# Patient Record
Sex: MALE | Race: WHITE | Employment: FULL TIME | URBAN - METROPOLITAN AREA
[De-identification: names, ages, dates, MRNs, and addresses within clinical notes are randomized per-mention and may not be internally consistent; named-entity substitution may affect disease eponyms.]

---

## 2019-05-29 ENCOUNTER — EMPLOYEE WELLNESS (OUTPATIENT)
Dept: OTHER | Age: 59
End: 2019-05-29

## 2019-05-29 LAB
CHOLESTEROL, TOTAL: 169 MG/DL (ref 0–199)
GLUCOSE BLD-MCNC: 105 MG/DL (ref 70–99)
HDLC SERPL-MCNC: 34 MG/DL (ref 40–60)
LDL CHOLESTEROL CALCULATED: 91 MG/DL
TRIGL SERPL-MCNC: 220 MG/DL (ref 0–150)

## 2019-06-03 VITALS — WEIGHT: 240 LBS

## 2019-08-14 ENCOUNTER — TELEPHONE (OUTPATIENT)
Dept: INTERNAL MEDICINE CLINIC | Age: 59
End: 2019-08-14

## 2019-10-18 ENCOUNTER — OFFICE VISIT (OUTPATIENT)
Dept: INTERNAL MEDICINE CLINIC | Age: 59
End: 2019-10-18
Payer: COMMERCIAL

## 2019-10-18 VITALS
HEART RATE: 60 BPM | BODY MASS INDEX: 29.35 KG/M2 | DIASTOLIC BLOOD PRESSURE: 74 MMHG | SYSTOLIC BLOOD PRESSURE: 136 MMHG | HEIGHT: 76 IN | WEIGHT: 241 LBS

## 2019-10-18 DIAGNOSIS — Z13.220 SCREENING CHOLESTEROL LEVEL: ICD-10-CM

## 2019-10-18 DIAGNOSIS — Z76.89 ENCOUNTER TO ESTABLISH CARE: Primary | ICD-10-CM

## 2019-10-18 DIAGNOSIS — Z13.1 DIABETES MELLITUS SCREENING: ICD-10-CM

## 2019-10-18 DIAGNOSIS — Z23 NEED FOR INFLUENZA VACCINATION: ICD-10-CM

## 2019-10-18 DIAGNOSIS — K21.9 GASTROESOPHAGEAL REFLUX DISEASE WITHOUT ESOPHAGITIS: ICD-10-CM

## 2019-10-18 DIAGNOSIS — Z00.00 ANNUAL PHYSICAL EXAM: ICD-10-CM

## 2019-10-18 PROCEDURE — 90686 IIV4 VACC NO PRSV 0.5 ML IM: CPT | Performed by: NURSE PRACTITIONER

## 2019-10-18 PROCEDURE — 99386 PREV VISIT NEW AGE 40-64: CPT | Performed by: NURSE PRACTITIONER

## 2019-10-18 PROCEDURE — 90471 IMMUNIZATION ADMIN: CPT | Performed by: NURSE PRACTITIONER

## 2019-10-18 RX ORDER — NIACIN 500 MG/1
1 TABLET, EXTENDED RELEASE ORAL
COMMUNITY
Start: 2011-10-05 | End: 2022-11-03

## 2019-10-18 RX ORDER — LANSOPRAZOLE 15 MG/1
1 CAPSULE, DELAYED RELEASE ORAL
COMMUNITY
Start: 2012-09-12

## 2019-10-18 ASSESSMENT — PATIENT HEALTH QUESTIONNAIRE - PHQ9
1. LITTLE INTEREST OR PLEASURE IN DOING THINGS: 0
SUM OF ALL RESPONSES TO PHQ9 QUESTIONS 1 & 2: 0
SUM OF ALL RESPONSES TO PHQ QUESTIONS 1-9: 0
2. FEELING DOWN, DEPRESSED OR HOPELESS: 0
SUM OF ALL RESPONSES TO PHQ QUESTIONS 1-9: 0

## 2019-10-21 PROBLEM — K21.9 GASTROESOPHAGEAL REFLUX DISEASE WITHOUT ESOPHAGITIS: Status: ACTIVE | Noted: 2019-10-21

## 2019-10-21 ASSESSMENT — ENCOUNTER SYMPTOMS
RESPIRATORY NEGATIVE: 1
GASTROINTESTINAL NEGATIVE: 1

## 2020-07-20 ENCOUNTER — EMPLOYEE WELLNESS (OUTPATIENT)
Dept: OTHER | Age: 60
End: 2020-07-20

## 2020-07-20 LAB
CHOLESTEROL, TOTAL: 179 MG/DL (ref 0–199)
GLUCOSE BLD-MCNC: 97 MG/DL (ref 70–99)
HDLC SERPL-MCNC: 31 MG/DL (ref 40–60)
LDL CHOLESTEROL CALCULATED: 91 MG/DL
TRIGL SERPL-MCNC: 287 MG/DL (ref 0–150)

## 2020-09-11 ENCOUNTER — TELEPHONE (OUTPATIENT)
Dept: INTERNAL MEDICINE CLINIC | Age: 60
End: 2020-09-11

## 2020-09-11 NOTE — TELEPHONE ENCOUNTER
If this message is directed to our office, please schedule an appointment for new patient physical at an opening for this

## 2020-09-11 NOTE — TELEPHONE ENCOUNTER
----- Message from Nora Huff sent at 9/11/2020 10:32 AM EDT -----  Subject: Appointment Request    Reason for Call: New Patient Request Appointment    QUESTIONS  Type of Appointment? New Patient/New to Provider  Reason for appointment request? No appointments available during search  Additional Information for Provider? Patient is requesting a new patient   appointment with Kulwinder Griffith. Screen green. ---------------------------------------------------------------------------  --------------  [facility] [employer] [facility]  What is the best way for the office to contact you? OK to leave message on   voicemail  Preferred Call Back Phone Number? [phone]  ---------------------------------------------------------------------------  --------------  SCRIPT ANSWERS  Relationship to Patient? Self  Appointment reason? Establish Care/Find a provider  Have you been diagnosed with   tested for   or told that you are suspected of having COVID-19 (Coronavirus)? No  Have you had a fever or taken medication to treat a fever within the past   3 days? No  Have you had a cough   shortness of breath or flu-like symptoms within the past 3 days? No  Do you currently have flu-like symptoms including fever or chills   cough   shortness of breath   or difficulty breathing   or new loss of taste or smell? No  (Service Expert  click yes below to proceed with [employer] As Usual   Scheduling)?  Yes

## 2020-09-11 NOTE — TELEPHONE ENCOUNTER
LVM for pt to call back the office to schedule appointment if patient would like   I did mention that Dr. Maye Dickson is booked out until end of December early Jan but were more than happy to schedule patient.

## 2020-10-19 VITALS — BODY MASS INDEX: 29.09 KG/M2 | WEIGHT: 239 LBS

## 2020-12-17 ENCOUNTER — OFFICE VISIT (OUTPATIENT)
Dept: INTERNAL MEDICINE CLINIC | Age: 60
End: 2020-12-17
Payer: COMMERCIAL

## 2020-12-17 VITALS
BODY MASS INDEX: 28.75 KG/M2 | HEART RATE: 70 BPM | TEMPERATURE: 97.3 F | HEIGHT: 76 IN | SYSTOLIC BLOOD PRESSURE: 128 MMHG | DIASTOLIC BLOOD PRESSURE: 72 MMHG | OXYGEN SATURATION: 98 % | WEIGHT: 236.1 LBS

## 2020-12-17 DIAGNOSIS — N40.0 BENIGN PROSTATIC HYPERPLASIA WITHOUT LOWER URINARY TRACT SYMPTOMS: ICD-10-CM

## 2020-12-17 DIAGNOSIS — E78.1 HYPERTRIGLYCERIDEMIA: ICD-10-CM

## 2020-12-17 DIAGNOSIS — E03.9 HYPOTHYROIDISM, UNSPECIFIED TYPE: ICD-10-CM

## 2020-12-17 DIAGNOSIS — Z00.00 ROUTINE GENERAL MEDICAL EXAMINATION AT A HEALTH CARE FACILITY: ICD-10-CM

## 2020-12-17 PROBLEM — G47.33 OSA (OBSTRUCTIVE SLEEP APNEA): Status: ACTIVE | Noted: 2020-12-17

## 2020-12-17 PROBLEM — R20.0 NUMBNESS AND TINGLING IN LEFT HAND: Status: ACTIVE | Noted: 2020-12-17

## 2020-12-17 PROBLEM — R20.2 NUMBNESS AND TINGLING IN LEFT HAND: Status: ACTIVE | Noted: 2020-12-17

## 2020-12-17 LAB
A/G RATIO: 2.2 (ref 1.1–2.2)
ALBUMIN SERPL-MCNC: 4.8 G/DL (ref 3.4–5)
ALP BLD-CCNC: 67 U/L (ref 40–129)
ALT SERPL-CCNC: 26 U/L (ref 10–40)
ANION GAP SERPL CALCULATED.3IONS-SCNC: 10 MMOL/L (ref 3–16)
AST SERPL-CCNC: 23 U/L (ref 15–37)
BILIRUB SERPL-MCNC: 1.2 MG/DL (ref 0–1)
BUN BLDV-MCNC: 18 MG/DL (ref 7–20)
CALCIUM SERPL-MCNC: 9.5 MG/DL (ref 8.3–10.6)
CHLORIDE BLD-SCNC: 103 MMOL/L (ref 99–110)
CHOLESTEROL, FASTING: 168 MG/DL (ref 0–199)
CO2: 27 MMOL/L (ref 21–32)
CREAT SERPL-MCNC: 0.9 MG/DL (ref 0.9–1.3)
GFR AFRICAN AMERICAN: >60
GFR NON-AFRICAN AMERICAN: >60
GLOBULIN: 2.2 G/DL
GLUCOSE BLD-MCNC: 115 MG/DL (ref 70–99)
HCT VFR BLD CALC: 44.6 % (ref 40.5–52.5)
HDLC SERPL-MCNC: 34 MG/DL (ref 40–60)
HEMOGLOBIN: 15.3 G/DL (ref 13.5–17.5)
HEPATITIS C ANTIBODY INTERPRETATION: NORMAL
LDL CHOLESTEROL CALCULATED: 87 MG/DL
MCH RBC QN AUTO: 30.3 PG (ref 26–34)
MCHC RBC AUTO-ENTMCNC: 34.2 G/DL (ref 31–36)
MCV RBC AUTO: 88.6 FL (ref 80–100)
PDW BLD-RTO: 13.3 % (ref 12.4–15.4)
PLATELET # BLD: 227 K/UL (ref 135–450)
PMV BLD AUTO: 8.1 FL (ref 5–10.5)
POTASSIUM SERPL-SCNC: 4.6 MMOL/L (ref 3.5–5.1)
PROSTATE SPECIFIC ANTIGEN: 1.4 NG/ML (ref 0–4)
RBC # BLD: 5.03 M/UL (ref 4.2–5.9)
SODIUM BLD-SCNC: 140 MMOL/L (ref 136–145)
TOTAL PROTEIN: 7 G/DL (ref 6.4–8.2)
TRIGLYCERIDE, FASTING: 235 MG/DL (ref 0–150)
TSH REFLEX: 2.4 UIU/ML (ref 0.27–4.2)
VLDLC SERPL CALC-MCNC: 47 MG/DL
WBC # BLD: 7.5 K/UL (ref 4–11)

## 2020-12-17 PROCEDURE — 99396 PREV VISIT EST AGE 40-64: CPT | Performed by: NURSE PRACTITIONER

## 2020-12-17 RX ORDER — ASPIRIN 81 MG/1
81 TABLET ORAL DAILY
COMMUNITY
End: 2022-11-03

## 2020-12-17 ASSESSMENT — PATIENT HEALTH QUESTIONNAIRE - PHQ9
SUM OF ALL RESPONSES TO PHQ QUESTIONS 1-9: 0
DEPRESSION UNABLE TO ASSESS: FUNCTIONAL CAPACITY MOTIVATION LIMITS ACCURACY
SUM OF ALL RESPONSES TO PHQ QUESTIONS 1-9: 0
1. LITTLE INTEREST OR PLEASURE IN DOING THINGS: 0
SUM OF ALL RESPONSES TO PHQ QUESTIONS 1-9: 0
2. FEELING DOWN, DEPRESSED OR HOPELESS: 0
SUM OF ALL RESPONSES TO PHQ9 QUESTIONS 1 & 2: 0

## 2020-12-17 ASSESSMENT — ENCOUNTER SYMPTOMS
WHEEZING: 0
SINUS PRESSURE: 0
BACK PAIN: 0
SHORTNESS OF BREATH: 0
DIARRHEA: 0
COLOR CHANGE: 0
ABDOMINAL PAIN: 0
CONSTIPATION: 0
SINUS PAIN: 0
COUGH: 0
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
## 2020-12-17 NOTE — ASSESSMENT & PLAN NOTE
Gradual improvement in symptoms   Continue ROM exercises   Tylenol/ NSiAIDs prn  Call if symptoms worsen or fail to improve

## 2020-12-18 LAB
HIV AG/AB: NORMAL
HIV ANTIGEN: NORMAL
HIV-1 ANTIBODY: NORMAL
HIV-2 AB: NORMAL

## 2020-12-21 PROBLEM — R73.01 IMPAIRED FASTING GLUCOSE: Status: ACTIVE | Noted: 2020-12-21

## 2020-12-28 ENCOUNTER — PATIENT MESSAGE (OUTPATIENT)
Dept: INTERNAL MEDICINE CLINIC | Age: 60
End: 2020-12-28

## 2020-12-28 NOTE — TELEPHONE ENCOUNTER
From: Gissell Coreas  To: SONIA Zuleta - CNP  Sent: 12/28/2020 10:13 AM EST  Subject: Non-Urgent Medical Question    Shanthi Avila,   I tried to order some new CPAP supplies from [facility] for my sleep apnea as I have in the past, but due to their requirement to have an updated script from my physician every two years, they could not refill my order (for a new mask and water supply tray). Can you write one for me? Please let me know. Thank you!   Taz Vega

## 2021-01-12 ENCOUNTER — OFFICE VISIT (OUTPATIENT)
Dept: PRIMARY CARE CLINIC | Age: 61
End: 2021-01-12
Payer: COMMERCIAL

## 2021-01-12 VITALS
TEMPERATURE: 98.5 F | SYSTOLIC BLOOD PRESSURE: 125 MMHG | HEART RATE: 82 BPM | OXYGEN SATURATION: 97 % | WEIGHT: 241.6 LBS | BODY MASS INDEX: 28.53 KG/M2 | DIASTOLIC BLOOD PRESSURE: 75 MMHG | HEIGHT: 77 IN

## 2021-01-12 DIAGNOSIS — E78.1 HYPERTRIGLYCERIDEMIA: ICD-10-CM

## 2021-01-12 DIAGNOSIS — Z23 NEED FOR SHINGLES VACCINE: ICD-10-CM

## 2021-01-12 DIAGNOSIS — Z12.11 SCREEN FOR COLON CANCER: Primary | ICD-10-CM

## 2021-01-12 DIAGNOSIS — R73.9 HYPERGLYCEMIA: ICD-10-CM

## 2021-01-12 PROCEDURE — 99213 OFFICE O/P EST LOW 20 MIN: CPT | Performed by: FAMILY MEDICINE

## 2021-01-12 SDOH — ECONOMIC STABILITY: INCOME INSECURITY: HOW HARD IS IT FOR YOU TO PAY FOR THE VERY BASICS LIKE FOOD, HOUSING, MEDICAL CARE, AND HEATING?: NOT HARD AT ALL

## 2021-01-12 ASSESSMENT — PATIENT HEALTH QUESTIONNAIRE - PHQ9
SUM OF ALL RESPONSES TO PHQ QUESTIONS 1-9: 0
1. LITTLE INTEREST OR PLEASURE IN DOING THINGS: 0
SUM OF ALL RESPONSES TO PHQ QUESTIONS 1-9: 0

## 2021-01-12 NOTE — PROGRESS NOTES
Jono Hebert (:  1960) is a 64 y.o. male,Established patient, here for evaluation of the following chief complaint(s):  Establish Care (blood test)      ASSESSMENT/PLAN:  Hyperglycemia  Hypertrig      P: refer to GI  Get labs  Shingrix, will have to get this in a pharmacy    SUBJECTIVE/OBJECTIVE:  HPI 63 yo man, hist of high trig/ low HDL  And recently  started 1 gm fish oil daily. On lansoprazole for his GERD  Review of Systems    Physical Exam  Constitutional:       Appearance: Normal appearance. HENT:      Head: Normocephalic and atraumatic. Neck:      Thyroid: No thyromegaly. Vascular: No carotid bruit. Cardiovascular:      Rate and Rhythm: Normal rate and regular rhythm. Heart sounds: No murmur. Pulmonary:      Effort: Pulmonary effort is normal.      Breath sounds: Normal breath sounds. Abdominal:      General: Bowel sounds are normal.      Palpations: Abdomen is soft. There is no hepatomegaly or splenomegaly. Tenderness: There is no abdominal tenderness. Musculoskeletal:      Right lower leg: No edema. Left lower leg: No edema. Lymphadenopathy:      Cervical: No cervical adenopathy. Neurological:      Mental Status: He is alert. Psychiatric:         Mood and Affect: Mood normal.         Behavior: Behavior normal.       Vitals:    21 1530   BP: 125/75   Site: Right Upper Arm   Position: Sitting   Cuff Size: Medium Adult   Pulse: 82   Temp: 98.5 °F (36.9 °C)   TempSrc: Infrared   SpO2: 97%   Weight: 241 lb 9.6 oz (109.6 kg)   Height: 6' 4.8\" (1.951 m)     An electronic signature was used to authenticate this note.     --Karina Dyer MD

## 2021-01-13 DIAGNOSIS — R73.9 HYPERGLYCEMIA: ICD-10-CM

## 2021-01-13 DIAGNOSIS — E78.1 HYPERTRIGLYCERIDEMIA: ICD-10-CM

## 2021-01-13 LAB
CHOLESTEROL, TOTAL: 191 MG/DL (ref 0–199)
GLUCOSE FASTING: 98 MG/DL (ref 70–99)
HDLC SERPL-MCNC: 35 MG/DL (ref 40–60)
LDL CHOLESTEROL CALCULATED: 106 MG/DL
TRIGL SERPL-MCNC: 251 MG/DL (ref 0–150)
VLDLC SERPL CALC-MCNC: 50 MG/DL

## 2021-01-14 LAB
ESTIMATED AVERAGE GLUCOSE: 105.4 MG/DL
HBA1C MFR BLD: 5.3 %

## 2021-01-16 PROBLEM — Z00.00 ROUTINE GENERAL MEDICAL EXAMINATION AT A HEALTH CARE FACILITY: Status: RESOLVED | Noted: 2020-12-17 | Resolved: 2021-01-16

## 2021-04-16 ENCOUNTER — OFFICE VISIT (OUTPATIENT)
Dept: PRIMARY CARE CLINIC | Age: 61
End: 2021-04-16
Payer: COMMERCIAL

## 2021-04-16 DIAGNOSIS — Z01.818 PREOP EXAMINATION: Primary | ICD-10-CM

## 2021-04-16 LAB — SARS-COV-2: NOT DETECTED

## 2021-04-16 PROCEDURE — 99211 OFF/OP EST MAY X REQ PHY/QHP: CPT | Performed by: NURSE PRACTITIONER

## 2021-04-22 ENCOUNTER — ANESTHESIA (OUTPATIENT)
Dept: ENDOSCOPY | Age: 61
End: 2021-04-22
Payer: COMMERCIAL

## 2021-04-22 ENCOUNTER — HOSPITAL ENCOUNTER (OUTPATIENT)
Age: 61
Setting detail: OUTPATIENT SURGERY
Discharge: HOME | End: 2021-04-22
Attending: INTERNAL MEDICINE | Admitting: INTERNAL MEDICINE
Payer: COMMERCIAL

## 2021-04-22 ENCOUNTER — ANESTHESIA EVENT (OUTPATIENT)
Dept: ENDOSCOPY | Age: 61
End: 2021-04-22
Payer: COMMERCIAL

## 2021-04-22 VITALS
RESPIRATION RATE: 16 BRPM | TEMPERATURE: 97.8 F | DIASTOLIC BLOOD PRESSURE: 75 MMHG | HEIGHT: 76 IN | OXYGEN SATURATION: 99 % | SYSTOLIC BLOOD PRESSURE: 114 MMHG | WEIGHT: 230 LBS | BODY MASS INDEX: 28.01 KG/M2 | HEART RATE: 53 BPM

## 2021-04-22 VITALS
DIASTOLIC BLOOD PRESSURE: 64 MMHG | RESPIRATION RATE: 16 BRPM | OXYGEN SATURATION: 100 % | SYSTOLIC BLOOD PRESSURE: 105 MMHG

## 2021-04-22 PROCEDURE — 3700000000 HC ANESTHESIA ATTENDED CARE: Performed by: INTERNAL MEDICINE

## 2021-04-22 PROCEDURE — 3700000001 HC ADD 15 MINUTES (ANESTHESIA): Performed by: INTERNAL MEDICINE

## 2021-04-22 PROCEDURE — 2500000003 HC RX 250 WO HCPCS: Performed by: REGISTERED NURSE

## 2021-04-22 PROCEDURE — 2580000003 HC RX 258: Performed by: ANESTHESIOLOGY

## 2021-04-22 PROCEDURE — 88305 TISSUE EXAM BY PATHOLOGIST: CPT

## 2021-04-22 PROCEDURE — 7100000011 HC PHASE II RECOVERY - ADDTL 15 MIN: Performed by: INTERNAL MEDICINE

## 2021-04-22 PROCEDURE — 3609010600 HC COLONOSCOPY POLYPECTOMY SNARE/COLD BIOPSY: Performed by: INTERNAL MEDICINE

## 2021-04-22 PROCEDURE — 6360000002 HC RX W HCPCS: Performed by: REGISTERED NURSE

## 2021-04-22 PROCEDURE — 2709999900 HC NON-CHARGEABLE SUPPLY: Performed by: INTERNAL MEDICINE

## 2021-04-22 PROCEDURE — 7100000010 HC PHASE II RECOVERY - FIRST 15 MIN: Performed by: INTERNAL MEDICINE

## 2021-04-22 RX ORDER — PROPOFOL 10 MG/ML
INJECTION, EMULSION INTRAVENOUS CONTINUOUS PRN
Status: DISCONTINUED | OUTPATIENT
Start: 2021-04-22 | End: 2021-04-22 | Stop reason: SDUPTHER

## 2021-04-22 RX ORDER — LIDOCAINE HYDROCHLORIDE 20 MG/ML
INJECTION, SOLUTION EPIDURAL; INFILTRATION; INTRACAUDAL; PERINEURAL PRN
Status: DISCONTINUED | OUTPATIENT
Start: 2021-04-22 | End: 2021-04-22 | Stop reason: SDUPTHER

## 2021-04-22 RX ORDER — SODIUM CHLORIDE 9 MG/ML
INJECTION, SOLUTION INTRAVENOUS CONTINUOUS
Status: DISCONTINUED | OUTPATIENT
Start: 2021-04-22 | End: 2021-04-22 | Stop reason: HOSPADM

## 2021-04-22 RX ORDER — PROPOFOL 10 MG/ML
INJECTION, EMULSION INTRAVENOUS PRN
Status: DISCONTINUED | OUTPATIENT
Start: 2021-04-22 | End: 2021-04-22 | Stop reason: SDUPTHER

## 2021-04-22 RX ORDER — GLYCOPYRROLATE 0.2 MG/ML
INJECTION INTRAMUSCULAR; INTRAVENOUS PRN
Status: DISCONTINUED | OUTPATIENT
Start: 2021-04-22 | End: 2021-04-22 | Stop reason: SDUPTHER

## 2021-04-22 RX ADMIN — SODIUM CHLORIDE: 9 INJECTION, SOLUTION INTRAVENOUS at 08:09

## 2021-04-22 RX ADMIN — PROPOFOL 100 MG: 10 INJECTION, EMULSION INTRAVENOUS at 09:13

## 2021-04-22 RX ADMIN — SODIUM CHLORIDE: 9 INJECTION, SOLUTION INTRAVENOUS at 09:10

## 2021-04-22 RX ADMIN — PROPOFOL 140 MCG/KG/MIN: 10 INJECTION, EMULSION INTRAVENOUS at 09:13

## 2021-04-22 RX ADMIN — GLYCOPYRROLATE 0.2 MG: 0.2 INJECTION, SOLUTION INTRAMUSCULAR; INTRAVENOUS at 09:20

## 2021-04-22 RX ADMIN — GLYCOPYRROLATE 0.2 MG: 0.2 INJECTION, SOLUTION INTRAMUSCULAR; INTRAVENOUS at 09:14

## 2021-04-22 RX ADMIN — LIDOCAINE HYDROCHLORIDE 100 MG: 20 INJECTION, SOLUTION EPIDURAL; INFILTRATION; INTRACAUDAL; PERINEURAL at 09:13

## 2021-04-22 ASSESSMENT — ENCOUNTER SYMPTOMS: SHORTNESS OF BREATH: 0

## 2021-04-22 NOTE — PROGRESS NOTES
Dr Tori Steen at bedside reviewing results
See anesthesia record for Propofol dosages and vital signs.
To endo recovery from procedure room. VSS, awakens to voice, respirations easy and unlabored.
communication. If the ride is leaving the hospital grounds please make sure they are back in time for pickup. Have the patient inform the staff on arrival what their rides plans are while the patient is in the facility. At the MAIN there is one visitor allowed. Please note that the visitor policy is subject to change.

## 2021-04-22 NOTE — ANESTHESIA POSTPROCEDURE EVALUATION
Department of Anesthesiology  Postprocedure Note    Patient: Khari Tate  MRN: [id number]  YOB: 1960  Date of evaluation: 4/22/2021  Time:  9:51 AM     Procedure Summary     Date: 04/22/21 Room / Location: [address] [place] [address]    Anesthesia Start: 0022 Anesthesia Stop: 5609    Procedure: COLONOSCOPY POLYPECTOMY SNARE/COLD BIOPSY (N/A ) Diagnosis: (COLON CANCER SCREENING Z12.11)    Surgeons: Duane Youngblood MD Responsible Provider: Sophie Breaux MD    Anesthesia Type: MAC ASA Status: 2          Anesthesia Type: MAC    Pearl Phase I: Pearl Score: 10    Pearl Phase II: Pearl Score: 9    Last vitals: Reviewed and per EMR flowsheets.        Anesthesia Post Evaluation    Patient location during evaluation: PACU  Level of consciousness: awake  Airway patency: patent  Complications: no  Cardiovascular status: hemodynamically stable  Respiratory status: acceptable

## 2021-04-22 NOTE — H&P
Gastroenterology Note             Pre-operative History and Physical    Patient: Ricardo Covert  : 1960  CSN:     History Obtained From:  patient and/or guardian. HISTORY OF PRESENT ILLNESS:    The patient is a 64 y.o. male  here for colonoscopy. Past Medical History:    Past Medical History:   Diagnosis Date    GERD (gastroesophageal reflux disease)     Hypercholesterolemia     Sleep apnea     Testicular hypofunction      Past Surgical History:    Past Surgical History:   Procedure Laterality Date    ACHILLES TENDON SURGERY Right     CERVICAL DISC SURGERY  1984     Medications Prior to Admission:   No current facility-administered medications on file prior to encounter. Current Outpatient Medications on File Prior to Encounter   Medication Sig Dispense Refill    aspirin 81 MG EC tablet Take 81 mg by mouth daily      niacin (NIASPAN) 500 MG extended release tablet Take 1 tablet by mouth       lansoprazole (PREVACID) 15 MG delayed release capsule Take 1 capsule by mouth          Allergies:  Patient has no known allergies.       Social History:   Social History     Tobacco Use    Smoking status: Never Smoker    Smokeless tobacco: Never Used   Substance Use Topics    Alcohol use: Not Currently     Family History:   Family History   Problem Relation Age of Onset    Cancer Mother         pancreatic     Heart Disease Father     Heart Disease Paternal Uncle     Heart Attack Paternal Uncle        PHYSICAL EXAM:      /84   Pulse 56   Temp 97.3 °F (36.3 °C) (Temporal)   Resp 16   Ht 6' 4\" (1.93 m)   Wt 230 lb (104.3 kg)   SpO2 98%   BMI 28.00 kg/m²  I        Heart:   RRR, normal s1s2    Lungs:  CTA bilat,  Normal effort    Abdomen:   NT, ND      ASA Grade:  ASA 2 - Patient with mild systemic disease with no functional limitations    Mallampati Class:  Class I: Soft palate, uvula, fauces, pillars visible  __________  Class II: Soft palate, uvula, fauces

## 2021-04-22 NOTE — ANESTHESIA PRE PROCEDURE
Department of Anesthesiology  Preprocedure Note       Name:  Nahum Ramos   Age:  64 y.o.  :  1960                                          MRN:  [id number]         Date:  2021      Surgeon: Greer Brian):  Malik Parry MD    Procedure: Procedure(s):  COLONOSCOPY DIAGNOSTIC    Medications prior to admission:   Prior to Admission medications    Medication Sig Start Date End Date Taking? Authorizing Provider   aspirin 81 MG EC tablet Take 81 mg by mouth daily    Historical Provider, MD   niacin (NIASPAN) 500 MG extended release tablet Take 1 tablet by mouth  10/5/11   Historical Provider, MD   lansoprazole (PREVACID) 15 MG delayed release capsule Take 1 capsule by mouth 12   Historical Provider, MD       Current medications:    No current facility-administered medications for this encounter. Allergies:  No Known Allergies    Problem List:    Patient Active Problem List   Diagnosis Code    Gastroesophageal reflux disease without esophagitis K21.9    KENNY (obstructive sleep apnea) G47.33    Hypertriglyceridemia E78.1    Numbness and tingling in left hand R20.0, R20.2    Impaired fasting glucose R73.01       Past Medical History:        Diagnosis Date    GERD (gastroesophageal reflux disease)     Hypercholesterolemia     Sleep apnea     Testicular hypofunction        Past Surgical History:        Procedure Laterality Date    ACHILLES TENDON SURGERY Right     CERVICAL DISC SURGERY  1984       Social History:    Social History     Tobacco Use    Smoking status: Never Smoker    Smokeless tobacco: Never Used   Substance Use Topics    Alcohol use: Not Currently                                Counseling given: Not Answered      Vital Signs (Current): There were no vitals filed for this visit.                                            BP Readings from Last 3 Encounters:   21 125/75   20 128/72   10/18/19 136/74       NPO Status: disease       Endo/Other:        (-) diabetes mellitus, hypothyroidism               Abdominal:           Vascular:     - PVD. Anesthesia Plan      MAC     ASA 2       Induction: intravenous. Anesthetic plan and risks discussed with patient. Plan discussed with CRNA.                   Lisa Larsen MD   4/22/2021

## 2021-09-09 LAB
CHOLESTEROL, TOTAL: 180 MG/DL (ref 0–199)
GLUCOSE BLD-MCNC: 96 MG/DL (ref 70–99)
HDLC SERPL-MCNC: 31 MG/DL (ref 40–60)
LDL CHOLESTEROL CALCULATED: 116 MG/DL
TRIGL SERPL-MCNC: 165 MG/DL (ref 0–150)

## 2022-04-15 ENCOUNTER — OFFICE VISIT (OUTPATIENT)
Dept: ORTHOPEDIC SURGERY | Age: 62
End: 2022-04-15
Payer: COMMERCIAL

## 2022-04-15 VITALS — BODY MASS INDEX: 28.49 KG/M2 | WEIGHT: 234 LBS | HEIGHT: 76 IN

## 2022-04-15 DIAGNOSIS — M25.511 RIGHT SHOULDER PAIN, UNSPECIFIED CHRONICITY: ICD-10-CM

## 2022-04-15 DIAGNOSIS — M47.9 SPONDYLOSIS: Primary | ICD-10-CM

## 2022-04-15 PROCEDURE — 99203 OFFICE O/P NEW LOW 30 MIN: CPT | Performed by: ORTHOPAEDIC SURGERY

## 2022-04-15 NOTE — PROGRESS NOTES
Date:  4/15/2022    Name:  Faviola Villafana  [facility]  [address] [facility] [address]    :  1960      Age:   63 y.o.    SSN:  [id number]      Medical Record Number:  [id number]    Reason for Visit:    Chief Complaint    Shoulder Pain (new patient right shoulder )      DOS:4/15/2022     HPI: Faviola Villafana is a 63 y.o. male here today for right shoulder pain and neck pain. Patient reports right neck pain radiated down to his shoulder and his right upper arm and sometimes he has numbness and paresthesia over his fingers. He has a history of cervical spine fusion several years ago. He describes his pain as dull aching quality especially when he woke up from sleep or when he sleep at the same side. Also he describes sharp shooting pain from his neck to his upper arm when he moves his neck. Otherwise he denies popping, clicking, or any other mechanical symptoms in his shoulder. Pain Assessment  Location of Pain: Shoulder  Location Modifiers: Right  Severity of Pain: 2  Quality of Pain: Sharp  Duration of Pain: Persistent  Frequency of Pain: Intermittent  Aggravating Factors: Stretching  Limiting Behavior: Some  Relieving Factors: Rest,Ice,Exercise,Nsaids  Result of Injury: No  Work-Related Injury: No  Are there other pain locations you wish to document?: No  ROS: All systems reviewed on patient intake form. Pertinent items are noted in HPI.         Past Medical History:   Diagnosis Date    GERD (gastroesophageal reflux disease)     Hypercholesterolemia     Sleep apnea     Testicular hypofunction         Past Surgical History:   Procedure Laterality Date    ACHILLES TENDON SURGERY Right     CERVICAL DISC SURGERY  1984    COLONOSCOPY N/A 2021    COLONOSCOPY POLYPECTOMY SNARE/COLD BIOPSY performed by Zheng Haney MD at [address]       Family History   Problem Relation Age of Onset    Cancer Mother         pancreatic     Heart Disease Father     Heart Disease Paternal Uncle     Heart Attack Paternal Uncle        Social History     Socioeconomic History    Marital status:      Spouse name: None    Number of children: None    Years of education: None    Highest education level: None   Occupational History    None   Tobacco Use    Smoking status: Never Smoker    Smokeless tobacco: Never Used   Vaping Use    Vaping Use: Never used   Substance and Sexual Activity    Alcohol use: Not Currently    Drug use: Never    Sexual activity: Yes   Other Topics Concern    None   Social History Narrative    None     Social Determinants of Health     Financial Resource Strain:     Difficulty of Paying Living Expenses: Not on file   Food Insecurity:     Worried About Running Out of Food in the Last Year: Not on file    King of Food in the Last Year: Not on file   Transportation Needs:     Lack of Transportation (Medical): Not on file    Lack of Transportation (Non-Medical):  Not on file   Physical Activity:     Days of Exercise per Week: Not on file    Minutes of Exercise per Session: Not on file   Stress:     Feeling of Stress : Not on file   Social Connections:     Frequency of Communication with Friends and Family: Not on file    Frequency of Social Gatherings with Friends and Family: Not on file    Attends Jew Services: Not on file    Active Member of [address] [place] [address] or Organizations: Not on file    Attends Club or Organization Meetings: Not on file    Marital Status: Not on file   Intimate Partner Violence:     Fear of Current or Ex-Partner: Not on file    Emotionally Abused: Not on file    Physically Abused: Not on file    Sexually Abused: Not on file   Housing Stability:     Unable to Pay for Housing in the Last Year: Not on file    Number of [place] in the Last Year: Not on file    Unstable Housing in the Last Year: Not on file       Current Outpatient Medications   Medication Sig Dispense Refill    aspirin 81 MG EC tablet Take 81 mg by mouth daily      niacin (NIASPAN) 500 MG extended release tablet Take 1 tablet by mouth       lansoprazole (PREVACID) 15 MG delayed release capsule Take 1 capsule by mouth       No current facility-administered medications for this visit. No Known Allergies    Vital signs:  Ht 6' 4\" (1.93 m)   Wt 234 lb (106.1 kg)   BMI 28.48 kg/m²          Right shoulder exam    Inspection:  Held in a normal posture. Normal contour at the acromioclavicular joint. No swelling, ecchymosis, or erythema about the shoulder. No atrophy appreciated. No scapular winging. Palpation:  No subacromial crepitus. No tenderness of the AC joint. No greater tuberosity tenderness. No tenderness in the bicipital groove. Range of Motion: Full passive and active ROM. Normal scapulothoracic rhythm. Strength:  Normal supraspinatus, infraspinatus, and subscapularis muscle strength. Stability: No anterior instability. No posterior instability. Special Tests: Impingement findings are negative. Labral findings are negative. Speed sign and Yergason signs are both negative. Crossover sign is negative. Belly press sign is negative. Lift off sign is negative. Other findings: The skin is warm dry and well perfused. 2+ radial pulse. Sensation is intact to light touch over the deltoid. Left comparison shoulder exam    Inspection:  Held in a normal posture. Normal contour at the acromioclavicular joint. No swelling, ecchymosis, or erythema about the shoulder. No atrophy appreciated. No scapular winging. Palpation:  No subacromial crepitus. No tenderness of the AC joint. No greater tuberosity tenderness. No tenderness in the bicipital groove. Range of Motion: Full passive and active ROM. Normal scapulothoracic rhythm. Strength:  Normal supraspinatus, infraspinatus, and subscapularis muscle strength. Stability: No anterior instability. No posterior instability. Special Tests: Impingement findings are negative.  Labral findings are negative. Speed sign and Yergason signs are both negative. Crossover sign is negative. Belly press sign is negative. Lift off sign is negative. Other findings: The skin is warm dry and well perfused. 2+ radial pulse. Sensation is intact to light touch over the deltoid. CERVICAL SPINE EXAMINATION:     Inspection:  Local inspection shows no step-off or bruising. Cervical alignment is normal.     Palpation: There is tenderness over his cervical spine. Range of Motion: Full ROM including flexion, extension, and lateral bending     Strength: 5/5 bilateral upper extremities     Skin: Intact without rashes ulcerations or lesions. Sensation: Intact to light touch throughout both upper extremities. Special Tests: Negative Spurling. No focal motor deficits present throughout the upper extremities. Diagnostics:  Radiology:       X-rays of the right shoulder including  Grashey,  scapular Y and  axillary views were obtained and reviewed in office:    Impression: There is no evidence of fracture or dislocation. There is no evidence of glenohumeral osteoarthritis. Cervical x-ray:    AP and Lateral views were obtained and reviewed of the cervical spine. Impression: There is evidence of cervical spine fusion with some degenerative changes especially below the level of the fusion at C7-T1. He also has degenerative changes over his cervical spine    Assessment: 63years old male patient with cervical radiculopathy due to cervical spine spondylosis post spinal fusion    Plan: Pertinent imaging was reviewed. The etiology, natural history, and treatment options for the disorder were discussed. The roles of activity medication, antiinflammatories, injections, bracing, physical therapy, and surgical interventions were all described to the patient and questions were answered. Patient has right upper extremity radiculopathy. Symptoms mostly coming from his neck.   He has a history of cervical spine fusion several years ago. His x-ray shows that he has degenerative changes over his cervical spine especially at the level below the fusion. We think his symptoms coming from his neck because he has radiculopathy with numbness and paresthesia of both all over his right upper extremity. Clinical examination of his shoulder shows no rotator cuff muscle pathology and no impingement sign. He has full strength in his shoulder with no issues. We will refer him to the physical therapy to start working on his cervical spine addresses pain. We will see him back for a month if there is no improvement in his symptoms and may consider referring him for spine surgery. Richard Dang is in agreement with this plan. All questions were answered to patient's satisfaction and was encouraged to call with any further questions. The patient was advised that NSAID-type medications have several potential side effects that include: gastrointestinal irritation including hemorrhage, renal injury, as well as an increased risk for heart attack and stroke. The patient was asked to take the medication with food and to stop if there is any symptoms of GI upset, including heartburn, nausea, increased gas or diarrhea. I asked the patient to contact their medical provider for vomiting, abdominal pain or black/bloody stools. The patient should have renal function testing per his medical provider periodically if the medication is taken on a regular basis. The patient should be alert for any swelling in the lower extremities and should stop taking the medication immediately and contact their medical provider should this occur. In addition, the patient should stop taking the medication immediately and contact their medical provider should there be any shortness of breath, fatigue and be evaluated in an emergency facility for any chest pain.   The patient expresses understanding of these issues and questions were answered. Total time spent for evaluation, education, and development of treatment plan: 43 minutes. 4/15/2022  12:47 PM      Steffen Alvarez MD  Clinical Fellow at [address] [place] [address]    During this examination, Win Danielle MD functioned as a scribe for Dr. Dorys Mishra. This dictation was performed with a verbal recognition program (DRAGON) and it was checked for errors. It is possible that there are still dictated errors within this office note. If so, please bring any errors to my attention for an addendum. All efforts were made to ensure that this office note is accurate. Orders Placed This Encounter   Procedures    XR SHOULDER RIGHT (MIN 2 VIEWS)     [id number]     Standing Status:   Future     Number of Occurrences:   1     Standing Expiration Date:   4/14/2023     Order Specific Question:   Reason for exam:     Answer:   Pain    XR CERVICAL SPINE (2-3 VIEWS)     Standing Status:   Future     Number of Occurrences:   1     Standing Expiration Date:   4/15/2023     Order Specific Question:   Reason for exam:     Answer:   pain    Ambulatory referral to Physical Therapy     Referral Priority:   Routine     Referral Type:   Eval and Treat     Referral Reason:   Specialty Services Required     Requested Specialty:   Physical Therapy     Number of Visits Requested:   1     I attest that I met personally with the patient, performed the described exam, reviewed the radiographic studies and medical records associated with this patient and supervised the services that are described above.      Dorys Verma MD

## 2022-04-25 ENCOUNTER — HOSPITAL ENCOUNTER (OUTPATIENT)
Dept: PHYSICAL THERAPY | Age: 62
Setting detail: THERAPIES SERIES
Discharge: HOME | End: 2022-04-25
Payer: COMMERCIAL

## 2022-04-25 PROCEDURE — 97110 THERAPEUTIC EXERCISES: CPT | Performed by: PHYSICAL THERAPIST

## 2022-04-25 PROCEDURE — 97161 PT EVAL LOW COMPLEX 20 MIN: CPT | Performed by: PHYSICAL THERAPIST

## 2022-04-25 NOTE — PLAN OF CARE
The [address]  Phone [phone]  Fax [phone]                                                          Physical Therapy Certification    Dear Jayro Lee  ,    We had the pleasure of evaluating the following patient for physical therapy services at [address] [place] [address]. A summary of our findings can be found in the initial assessment below. This includes our plan of care. If you have any questions or concerns regarding these findings, please do not hesitate to contact me at the office phone number checked above. Thank you for the referral.       Physician Signature:_______________________________Date:__________________  By signing above (or electronic signature), therapists plan is approved by physician      Patient: Jade Franz   : 1960   MRN: [id number]  Referring Physician:  Jayro Lee      Evaluation Date: 2022      Medical Diagnosis Information:  Diagnosis: M47.9 (ICD-10-CM) - Spondylosis   Treatment Diagnosis: M54.2                                         Insurance information: PT Insurance Information: BCBS    Precautions/ Contra-indications:     Latex Allergy:  [x]NO      []YES  Preferred Language for Healthcare:   [x]English       []other:    SUBJECTIVE: Patient stated complaint: History of cervical fracture in  that required cervical fusion and Halo. 2 year recovery. Recent episode of pain, numbness and tingling over the last several weeks.        Relevant Medical History:History of cervical fracture in  that required cervical fusion and Halo    C-SSRS Triggered by Intake questionnaire (Past 2 wk assessment):   [x] No, Questionnaire did not trigger screening.   [] Yes, Patient intake triggered further evaluation      [] C-SSRS Screening completed  [] PCP notified via Plan of Care  [] Emergency services notified     Functional Disability Index: Foto 50/100    Pain Scale: 1-6/10  Easing factors: arm over head, change positions. Provocative factors: laying down and getting up in am is worst.       Type: []Constant   [x]Intermittent  [x]Radiating []Localized []other:     Numbness/Tingling: yes to R hand     Occupation/School:financial  consultant     Living Status/Prior Level of Function: Independent with ADLs and IADLs, running  Pushups     OBJECTIVE:     ROM AROM Comments   Flexion full    Extension Moderately limited    Side bend R 25% N/T into R UE    Side bend L 25%    Rotation R 50% N/T into R UE    Rotation L 50%    Shoulder AROM clearing              Strength L R Comments   Neck flexion (C1-2)  4+    Neck side bend (C3)  4+    Shoulder elevation (C4)  4+ some N/T R    Shoulder abduction (C5)  4+ some N/T R    Elbow flexion and /or wrist extension  (C6)  5    Elbow extension and/or wrist flexion  (C7)  5 some N/T R    Thumb extension and/or ulnar deviation ((C8)  5    Abduction and /or adduction of hand intrinsics (T1)  5          DTR L R Comments   Biceps (C5,C6) 1-2/3 1/3    Brachioradialis (C5,C6) 2/3 2/3    Triceps (C7,C8) 1-2/3 1/3              Special Tests Results Comments   Foraminal compression test      Distraction test     Upper limb tension test     Shoulder abduction(relief) test  Relief     Vertebral artery test      Valsalva test     Neurologic Signs N/T    Joint play assessments  Not assessed     other         Palpation: none performed    Functional Mobility/Transfers: wnl    Posture: mildly slouched with FHP    Bandages/Dressings/Incisions:     Gait: (include devices/WB status): WNL                          [x] Patient history, allergies, meds reviewed. Medical chart reviewed. See intake form. Review Of Systems (ROS):  [x]Performed Review of systems (Integumentary, CardioPulmonary, Neurological) by intake and observation. Intake form has been scanned into medical record.  Patient has been instructed to contact their primary care physician regarding ROS issues if not already being addressed at this time. Co-morbidities/Complexities (which will affect course of rehabilitation):   []None           Arthritic conditions   []Rheumatoid arthritis (M05.9)  []Osteoarthritis (M19.91)   Cardiovascular conditions   []Hypertension (I10)  []Hyperlipidemia (E78.5)  []Angina pectoris (I20)  []Atherosclerosis (I70)   Musculoskeletal conditions   []Disc pathology   []Congenital spine pathologies   [x]Prior surgical intervention  []Osteoporosis (M81.8)  []Osteopenia (M85.8)   Endocrine conditions   []Hypothyroid (E03.9)  []Hyperthyroid Gastrointestinal conditions   []Constipation (K80.35)   Metabolic conditions   []Morbid obesity (E66.01)  []Diabetes type 1(E10.65) or 2 (E11.65)   []Neuropathy (G60.9)     Pulmonary conditions   []Asthma (J45)  []Coughing   []COPD (J44.9)   Psychological Disorders  []Anxiety (F41.9)  []Depression (F32.9)   []Other:   []Other:          Barriers to/and or personal factors that will affect rehab potential:              [x]Age  [x]Sex              [x]Motivation/Lack of Motivation                        [x]Co-Morbidities              []Cognitive Function, education/learning barriers              []Environmental, home barriers              []profession/work barriers  []past PT/medical experience  []other:      Falls Risk Assessment (30 days):   [x] Falls Risk assessed and no intervention required.   [] Falls Risk assessed and Patient requires intervention due to being higher risk   TUG score (>12s at risk):     [] Falls education provided, including              ASSESSMENT:    Functional Impairments:     []Noted cervical/thoracic/GHJ joint hypomobility   []Noted cervical/thoracic/GHJ joint hypermobility   [x]Decreased cervical/UE functional ROM   []Noted Headache pain aggravated by neck movements with/without dizziness   [x]Abnormal reflexes/sensation/myotomal/dermatomal deficits   []Decreased DCF control or ability to hold head up   []Decreased RC/scapular/core strength and neuromuscular control    [x]Decreased UE functional strength   []other:      Functional Activity Limitations (from functional questionnaire and intake)   [x]Reduced ability to tolerate prolonged functional positions   [x]Reduced ability or difficulty with changes of positions or transfers between positions   [x]Reduced ability to maintain good posture and demonstrate good body mechanics with sitting, bending, and lifting   [x] Reduced ability or tolerance with driving and/or computer work   [x]Reduced ability to perform lifting, reaching, carrying tasks   []Reduced ability to concentrate   [x]Reduced ability to sleep    [x]Reduced ability to tolerate any impact through UE or spine   [x]Reduced ability to ambulate prolonged functional periods/distances   []other:    Participation Restrictions   []Reduced participation in self care activities   [x]Reduced participation in home management activities   [x]Reduced participation in work activities   [x]Reduced participation in social activities. [x]Reduced participation in sport/recreational activities.     Classification/Subgrouping:   []signs/symptoms consistent with neck pain with mobility deficits     []signs/symptoms consistent with neck pain with movement coordinated impairments    [x]signs/symptoms consistent with neck pain with radiating pain    []signs/symptoms consistent with neck pain with headaches (cervicogenic)    []Signs/symptoms consistent with nerve root involvement including myotome & dermatome dysfunction   []sign/symptoms consistent with facet dysfunction of cervical and thoracic spine    []signs/symptoms consistent suggesting central cord compression/UMN syndromes   []signs/symptoms consistent with discogenic cervical pain   []signs/symptoms consistent with rib dysfunction   [x]signs/symptoms consistent with postural dysfunction   []signs/symptoms consistent with shoulder pathology    []signs/symptoms consistent with post-surgical status including decreased ROM, strength and function. []signs/symptoms consistent with pathology which may benefit from Dry Needling   []signs/symptoms which may limit the use of advanced manual therapy techniques: (Hypertension, recent trauma, intolerance to end range positions, prior TIA, visual issues, UE myotomes loss )     Prognosis/Rehab Potential:      []Excellent   [x]Good    []Fair   []Poor    Tolerance of evaluation/treatment:    []Excellent   [x]Good    []Fair   []Poor      Physical Therapy Evaluation Complexity Justification  [x] A history of present problem with:  [] no personal factors and/or comorbidities that impact the plan of care;  [x]1-2 personal factors and/or comorbidities that impact the plan of care  []3 personal factors and/or comorbidities that impact the plan of care  [x] An examination of body systems using standardized tests and measures addressing any of the following: body structures and functions (impairments), activity limitations, and/or participation restrictions;:  [x] a total of 1-2 or more elements   [] a total of 3 or more elements   [] a total of 4 or more elements   [x] A clinical presentation with:  [x] stable and/or uncomplicated characteristics   [] evolving clinical presentation with changing characteristics  [] unstable and unpredictable characteristics;   [x] Clinical decision making of [] low, [] moderate, [] high complexity using standardized patient assessment instrument and/or measurable assessment of functional outcome. [x] EVAL (LOW) 09860 (typically 20 minutes face-to-face)  [] EVAL (MOD) 51849 (typically 30 minutes face-to-face)  [] EVAL (HIGH) 91316 (typically 45 minutes face-to-face)  [] RE-EVAL     PLAN:   Frequency/Duration:  1-2 days per week for 12 Weeks:  Interventions:  [x]  Therapeutic exercise including: strength training, ROM, for cervical spine,scapula, core and Upper extremity, including postural re-education. [x]  NMR activation and proprioception for Deep cervical flexors, periscapular and RC muscles and Core, including postural re-education. [x]  Manual therapy as indicated for C/T spine, ribs, Soft tissue to include: Dry Needling/IASTM, STM, PROM, Gr I-IV mobilizations, manipulation. [x] Modalities as needed that may include: thermal agents, E-stim, Biofeedback, US, iontophoresis as indicated  [x] Patient education on joint protection, postural re-education, activity modification, progression of HEP. HEP instruction:   Access Code: [email or web address]  URL: [email or web address]  Date: 04/25/2022  Prepared by: Le Borjas    Exercises  Seated Cervical Sidebending Stretch - 5 x daily - 7 x weekly - 5 reps - 30 hold  Doorway Pec Stretch at 90 Degrees Abduction - 5 x daily - 7 x weekly - 5 reps - 30 hold  Supine Shoulder Horizontal Abduction with Resistance - 2 x daily - 7 x weekly - 3 sets - 10 reps  Standing Shoulder Diagonal Horizontal Abduction 60/120 Degrees with Resistance - 1 x daily - 7 x weekly - 3 sets - 10 reps    GOALS:   Patient stated goal: no pain, manage symptoms     [] Progressing: [] Met: [] Not Met: [] Adjusted    Therapist goals for Patient:   Short Term Goals: To be achieved in: 2-4 weeks  1. Independent in HEP and progression per patient tolerance, in order to prevent re-injury. [] Progressing: [] Met: [] Not Met: [] Adjusted   2. Patient will have a decrease in pain to facilitate improvement in movement, function, and ADLs as indicated by Functional Deficits. [] Progressing: [] Met: [] Not Met: [] Adjusted    Long Term Goals: To be achieved in: *12 weeks  1. Foto score 66+/100 to assist with reaching prior level of function. [] Progressing: [] Met: [] Not Met: [] Adjusted  2. Patient will demonstrate increased AROM to max potential  of cervical/thoracic spine to allow for proper joint functioning as indicated by patients Functional Deficits.      [] Progressing: [] Met: [] Not Met:

## 2022-04-25 NOTE — FLOWSHEET NOTE
[facility], [facility] [address] [place] [address], [address] [place] [address]  Phone: [phone]   Fax:     [phone]                                                     Physical Therapy Daily Treatment Note  Date:  2022    Patient Name:  Jose Carlos Carpenter    :  1960  MRN: [id number]  Restrictions/Precautions:    Medical/Treatment Diagnosis Information:  Diagnosis: M47.9 (ICD-10-CM) - Spondylosis  Treatment Diagnosis: U36.7  Insurance/Certification information:  PT Insurance Information: BCBS  Physician Information:   Marcia Capone  Has the plan of care been signed (Y/N):        []  Yes  [x]  No     Date of Patient follow up with Physician: per md.  Patient shows interest in a spinal consult with his history of cervical fusion. Is this a Progress Report:     []  Yes  [x]  No        If Yes:  Date Range for reporting period:  Beginning  Ending    Progress report will be due (10 Rx or 30 days whichever is less):       Recertification will be due (POC Duration  / 90 days whichever is less):         Visit # Insurance Allowable Auth Required   1 30 []  Yes []  No        Functional Scale:  Foto 50/100   Date assessed:       Latex Allergy:  [x]NO      []YES  Preferred Language for Healthcare:   [x]English       []other:      Pain level:  0-6/10     SUBJECTIVE:   See eval    OBJECTIVE: See eval    :      RESTRICTIONS/PRECAUTIONS: History of cervical fracture in  that required cervical fusion and Halo    Exercises/Interventions:   Exercise/Equipment Resistance/Repetitions Other comments   Stretching/PROM     CROM     Chin tuck Attempted but caused N/T    UT side bend stretch L ear to left shoulder [id number]    Levater scap stretch     Scalene stretch     Pectoral stretch Doorway 90/90 [id number]    Thread needle  npv    openbook npv    Isometrics     Retraction     shrugs     Cervical Flexion      Cervical Extension     Cervical sidebending               PRE's     External Rotation     Internal Rotation     Serratus npv    Biceps     Triceps     Shrugs     Horizontal Abd with ER     Reverse Flys     EXT     Flexion     scaption Attempted but caused N/T         Cable Column/Theraband     Scapular Retraction     External Rotation Attempted but caused N/T    Internal Rotation     Ext     TRIC     Lats     Shrugs     Flex     BIC     PNF     Supine hor abd    Supine cheerleader/diagnols 3x10    3x10          Manual Intervention      Cerv mobs/manip NO     Thoracic mobs/manip      CT manip NO     Rib mobilizations        Therapeutic Exercise and NMR EXR  [] (16353) Provided verbal/tactile cueing for activities related to strengthening, flexibility, endurance, ROM  for improvements in cervical, postural, scapular, scapulothoracic and UE control with self care, reaching, carrying, lifting, house/yardwork, driving/computer work.    [] (05128) Provided verbal/tactile cueing for activities related to improving balance, coordination, kinesthetic sense, posture, motor skill, proprioception  to assist with cervical, scapular, scapulothoracic and UE control with self care, reaching, carrying, lifting, house/yardwork, driving/computer work. Therapeutic Activities:    [] (32952 or 34481) Provided verbal/tactile cueing for activities related to improving balance, coordination, kinesthetic sense, posture, motor skill, proprioception and motor activation to allow for proper function of cervical, scapular, scapulothoracic and UE control with self care, carrying, lifting, driving/computer work.      Home Exercise Program:    [x] (35925) Reviewed/Progressed HEP activities related to strengthening, flexibility, endurance, ROM of cervical, scapular, scapulothoracic and UE control with self care, reaching, carrying, lifting, house/yardwork, driving/computer work  [] (00250) Reviewed/Progressed HEP activities related to improving balance, coordination, kinesthetic sense, posture, motor skill, proprioception of cervical, scapular, scapulothoracic and UE control with self care, reaching, carrying, lifting, house/yardwork, driving/computer work      Manual Treatments:  PROM / STM / Oscillations-Mobs:  G-I, II, III, IV (PA's, Inf., Post.)  [] (75675) Provided manual therapy to mobilize soft tissue/joints of cervical/CT, scapular GHJ and UE for the purpose of decreasing headache, modulating pain, promoting relaxation,  increasing ROM, reducing/eliminating soft tissue swelling/inflammation/restriction, improving soft tissue extensibility and allowing for proper ROM for normal function with self care, reaching, carrying, lifting, house/yardwork, driving/computer work    Modalities:      Charges:  Timed Code Treatment Minutes: 15   Total Treatment Minutes: 0126-4839       [x] EVAL (LOW) 31649 (typically 20 minutes face-to-face)  [] EVAL (MOD) 87240 (typically 30 minutes face-to-face)  [] EVAL (HIGH) 53799 (typically 45 minutes face-to-face)  [] RE-EVAL     [x] NR(15311) x  1   [] IONTO  [] NMR (18364) x     [] VASO  [] Manual (44739) x      [] Other:  [] TA x      [] Mech Traction (89961)  [] ES(attended) (79246)      [] ES (un) (34751):       GOALS:   Patient stated goal: no pain, manage symptoms     []? Progressing: []? Met: []? Not Met: []? Adjusted     Therapist goals for Patient:   Short Term Goals: To be achieved in: 2-4 weeks  1. Independent in HEP and progression per patient tolerance, in order to prevent re-injury. []? Progressing: []? Met: []? Not Met: []? Adjusted   2. Patient will have a decrease in pain to facilitate improvement in movement, function, and ADLs as indicated by Functional Deficits. []? Progressing: []? Met: []? Not Met: []? Adjusted     Long Term Goals: To be achieved in: *12 weeks  1. Foto score 66+/100 to assist with reaching prior level of function. []? Progressing: []? Met: []? Not Met: []? Adjusted  2.  Patient will demonstrate increased AROM to max potential  of cervical/thoracic spine to allow for proper joint functioning as indicated by patients Functional Deficits. []? Progressing: []? Met: []? Not Met: []? Adjusted  Patient will demonstrate an increase in postural awareness and control and activation of  Deep cervical stabilizers to allow for proper functional mobility as indicated by patients Functional Deficits. []? Progressing: []? Met: []? Not Met: []? Adjusted  4. Patient will return to  functional activities community walking , no pain with sleep or upon waking   without increased symptoms or restriction. []? Progressing: []? Met: []? Not Met: []? Adjusted     Overall Progression Towards Functional goals/ Treatment Progress Update:  [] Patient is progressing as expected towards functional goals listed. [] Progression is slowed due to complexities/Impairments listed. [] Progression has been slowed due to co-morbidities. [x] Plan just implemented, too soon to assess goals progression <30days   [] Goals require adjustment due to lack of progress  [] Patient is not progressing as expected and requires additional follow up with physician  [] Other    Prognosis for POC: [x] Good [] Fair  [] Poor      Patient requires continued skilled intervention: [x] Yes  [] No    Treatment/Activity Tolerance:  [x] Patient able to complete treatment  [] Patient limited by fatigue  [] Patient limited by pain     [] Patient limited by other medical complications  [] Other:                   Patient Education:Reviewed diagnosis, POC, HEP and its importance. Reviewed work station ergonomics including use of up/down desk and avoiding prolonged cervical rotation to look at multiple screens. , stress management       Access Code: [email or web address]  URL: "[email or web address]  Date: 04/25/2022  Prepared by: Rafiq Silverman     Exercises  Seated Cervical Sidebending Stretch - 5 x daily - 7 x weekly - 5 reps - 30 hold  Doorway Pec Stretch at 90 Degrees Abduction - 5 x daily - 7 x weekly - 5 reps - 30 hold  Supine Shoulder Horizontal Abduction with Resistance - 2 x daily - 7 x weekly - 3 sets - 10 reps  Standing Shoulder Diagonal Horizontal Abduction 60/120 Degrees with Resistance - 1 x daily - 7 x weekly - 3 sets - 10 reps     PLAN: See eval  [] Continue per plan of care [] Alter current plan (see comments above)  [x] Plan of care initiated [] Hold pending MD visit [] Discharge      Electronically signed by:  Jeane Beckham PT    Note: If patient does not return for scheduled/ recommended follow up visits, this note will serve as a discharge from care along with most recent update on progress.

## 2022-04-29 ENCOUNTER — HOSPITAL ENCOUNTER (OUTPATIENT)
Dept: PHYSICAL THERAPY | Age: 62
Setting detail: THERAPIES SERIES
Discharge: HOME | End: 2022-04-29
Payer: COMMERCIAL

## 2022-04-29 PROCEDURE — 97110 THERAPEUTIC EXERCISES: CPT | Performed by: PHYSICAL THERAPIST

## 2022-04-29 NOTE — FLOWSHEET NOTE
[address] [facility], [facility] [address] [place] [address], [address] [place] [address]  Phone: [phone]   Fax:     [phone]                                                     Physical Therapy Daily Treatment Note  Date:  2022    Patient Name:  Bea Barrios    :  1960  MRN: [id number]  Restrictions/Precautions:    Medical/Treatment Diagnosis Information:  Diagnosis: M47.9 (ICD-10-CM) - Spondylosis  Treatment Diagnosis: P97.7  Insurance/Certification information:  PT Insurance Information: [facility]  Physician Information:   Ubaldo Little  Has the plan of care been signed (Y/N):        []  Yes  [x]  No     Date of Patient follow up with Physician: per md.  Patient shows interest in a spinal consult with his history of cervical fusion. Is this a Progress Report:     []  Yes  [x]  No        If Yes:  Date Range for reporting period:  Beginning  Ending    Progress report will be due (10 Rx or 30 days whichever is less):       Recertification will be due (POC Duration  / 90 days whichever is less):         Visit # Insurance Allowable Auth Required   2 30 []  Yes []  No        Functional Scale: Foto 50/100   Date assessed:       Latex Allergy:  [x]NO      []YES  Preferred Language for Healthcare:   [x]English       []other:      Pain level:  0-6/10     SUBJECTIVE:   a little worse. Seeing NP at [facility] Monday.       OBJECTIVE: See eval    :      RESTRICTIONS/PRECAUTIONS: History of cervical fracture in  that required cervical fusion and Halo    Exercises/Interventions:   Exercise/Equipment Resistance/Repetitions Other comments   Pt education Reviewed head positions to decrease N/T/ pain     Stretching/PROM     CROM     Chin tuck Attempted but caused N/T    UT side bend stretch L ear to left shoulder [id number]    Levater scap stretch     Scalene stretch     Pectoral stretch Doorway 90/ [id number] reviewd diferent arm posotions for decreased N/T   Thread needle      openbook Stand at wall 5x ea start4/29   Isometrics     Retraction     shrugs     Cervical Flexion      Cervical Extension     Cervical sidebending               PRE's     External Rotation     Internal Rotation     Serratus Supine 3x10 cerv flexion ~35* start4/29   Biceps     Triceps     Shrugs     Horizontal Abd with ER     Reverse Flys     EXT     Flexion     scaption Attempted but caused N/T         Cable Column/Theraband     Scapular Retraction     External Rotation Attempted but caused N/T    Internal Rotation     Ext     TRIC     Lats     Shrugs     Flex     BIC     PNF     Supine hor abd cerv flexion ~35*    Supine cheerleader/diagnols cerv flexion ~35*   3x10 red    3x10 red          Manual Intervention      Cerv mobs/manip NO     Thoracic mobs/manip      CT manip NO     Rib mobilizations        Therapeutic Exercise and NMR EXR  [x] (70770) Provided verbal/tactile cueing for activities related to strengthening, flexibility, endurance, ROM  for improvements in cervical, postural, scapular, scapulothoracic and UE control with self care, reaching, carrying, lifting, house/yardwork, driving/computer work.    [] (96331) Provided verbal/tactile cueing for activities related to improving balance, coordination, kinesthetic sense, posture, motor skill, proprioception  to assist with cervical, scapular, scapulothoracic and UE control with self care, reaching, carrying, lifting, house/yardwork, driving/computer work. Therapeutic Activities:    [] (82466 or 00795) Provided verbal/tactile cueing for activities related to improving balance, coordination, kinesthetic sense, posture, motor skill, proprioception and motor activation to allow for proper function of cervical, scapular, scapulothoracic and UE control with self care, carrying, lifting, driving/computer work.      Home Exercise Program:    [x] (30061) Reviewed/Progressed HEP activities related to strengthening, flexibility, endurance, ROM of cervical, scapular, scapulothoracic and UE control with self care, reaching, carrying, lifting, house/yardwork, driving/computer work  [] (93199) Reviewed/Progressed HEP activities related to improving balance, coordination, kinesthetic sense, posture, motor skill, proprioception of cervical, scapular, scapulothoracic and UE control with self care, reaching, carrying, lifting, house/yardwork, driving/computer work      Manual Treatments:  PROM / STM / Oscillations-Mobs:  G-I, II, III, IV (PA's, Inf., Post.)  [] (45629) Provided manual therapy to mobilize soft tissue/joints of cervical/CT, scapular GHJ and UE for the purpose of decreasing headache, modulating pain, promoting relaxation,  increasing ROM, reducing/eliminating soft tissue swelling/inflammation/restriction, improving soft tissue extensibility and allowing for proper ROM for normal function with self care, reaching, carrying, lifting, house/yardwork, driving/computer work    Modalities:      Charges:  Timed Code Treatment Minutes: 35   Total Treatment Minutes: 5999-4423       [] EVAL (LOW) 93397 (typically 20 minutes face-to-face)  [] EVAL (MOD) 20664 (typically 30 minutes face-to-face)  [] EVAL (HIGH) 50308 (typically 45 minutes face-to-face)  [] RE-EVAL     [x] XB(99557) x  2   [] IONTO  [] NMR (59823) x     [] VASO  [] Manual (39253) x      [] Other:  [] TA x      [] Mech Traction (71645)  [] ES(attended) (88026)      [] ES (un) (59359):       GOALS:   Patient stated goal: no pain, manage symptoms     []? Progressing: []? Met: []? Not Met: []? Adjusted     Therapist goals for Patient:   Short Term Goals: To be achieved in: 2-4 weeks  1. Independent in HEP and progression per patient tolerance, in order to prevent re-injury. []? Progressing: []? Met: []? Not Met: []? Adjusted   2. Patient will have a decrease in pain to facilitate improvement in movement, function, and ADLs as indicated by Functional Deficits.     []? Progressing: []? Met: []? Not Met: []? Adjusted     Long Term Goals: To be achieved in: *12 weeks  1. Foto score 66+/100 to assist with reaching prior level of function. []? Progressing: []? Met: []? Not Met: []? Adjusted  2. Patient will demonstrate increased AROM to max potential  of cervical/thoracic spine to allow for proper joint functioning as indicated by patients Functional Deficits. []? Progressing: []? Met: []? Not Met: []? Adjusted  Patient will demonstrate an increase in postural awareness and control and activation of  Deep cervical stabilizers to allow for proper functional mobility as indicated by patients Functional Deficits. []? Progressing: []? Met: []? Not Met: []? Adjusted  4. Patient will return to  functional activities community walking , no pain with sleep or upon waking   without increased symptoms or restriction. []? Progressing: []? Met: []? Not Met: []? Adjusted     Overall Progression Towards Functional goals/ Treatment Progress Update:  [] Patient is progressing as expected towards functional goals listed. [] Progression is slowed due to complexities/Impairments listed. [] Progression has been slowed due to co-morbidities. [x] Plan just implemented, too soon to assess goals progression <30days   [] Goals require adjustment due to lack of progress  [] Patient is not progressing as expected and requires additional follow up with physician  [] Other    Prognosis for POC: [x] Good [] Fair  [] Poor      Patient requires continued skilled intervention: [x] Yes  [] No    Treatment/Activity Tolerance:  [x] Patient able to complete treatment  [] Patient limited by fatigue  [] Patient limited by pain     [] Patient limited by other medical complications  [x] Other: performed only ex that did not increase nerve c/o                   Patient Education:Reviewed diagnosis, POC, HEP and its importance.  Reviewed work station ergonomics including use of [employer] [place] and avoiding prolonged cervical rotation to look at multiple screens. , stress management       Access Code: [email or web address]  URL: [email or web address]  Date: 04/25/2022  Prepared by: Derrick Sapp     Exercises  Seated Cervical Sidebending Stretch - 5 x daily - 7 x weekly - 5 reps - 30 hold  Doorway Pec Stretch at 90 Degrees Abduction - 5 x daily - 7 x weekly - 5 reps - 30 hold  Supine Shoulder Horizontal Abduction with Resistance - 2 x daily - 7 x weekly - 3 sets - 10 reps  Standing Shoulder Diagonal Horizontal Abduction 60/120 Degrees with Resistance - 1 x daily - 7 x weekly - 3 sets - 10 reps     PLAN: See eval  [x] Continue per plan of care [] Alter current plan (see comments above)  [] Plan of care initiated [] Hold pending MD visit [] Discharge      Electronically signed by:  Derrick Sapp, PT    Note: If patient does not return for scheduled/ recommended follow up visits, this note will serve as a discharge from care along with most recent update on progress.

## 2022-05-11 ENCOUNTER — HOSPITAL ENCOUNTER (OUTPATIENT)
Dept: PHYSICAL THERAPY | Age: 62
Setting detail: THERAPIES SERIES
Discharge: HOME | End: 2022-05-11
Payer: COMMERCIAL

## 2022-05-11 PROCEDURE — 97140 MANUAL THERAPY 1/> REGIONS: CPT | Performed by: PHYSICAL THERAPIST

## 2022-05-11 NOTE — FLOWSHEET NOTE
[facility], [facility] [address], [address] [place] [address]  Phone: [phone]   Fax:     [phone]                                                     Physical Therapy Daily Treatment Note  Date:  2022    Patient Name:  Edwin Farah    :  1960  MRN: [id number]  Restrictions/Precautions:    Medical/Treatment Diagnosis Information:  Diagnosis: M47.9 (ICD-10-CM) - Spondylosis  Treatment Diagnosis: P81.6  Insurance/Certification information:  PT Insurance Information: [facility]  Physician Information:   Sina Perkins  Has the plan of care been signed (Y/N):        []  Yes  [x]  No     Date of Patient follow up with Physician: per md.  Patient shows interest in a spinal consult with his history of cervical fusion. Is this a Progress Report:     []  Yes  [x]  No        If Yes:  Date Range for reporting period:  Beginning  Ending    Progress report will be due (10 Rx or 30 days whichever is less):       Recertification will be due (POC Duration  / 90 days whichever is less):         Visit # Insurance Allowable Auth Required   3 30 []  Yes []  No        Functional Scale: Foto 50/100   Date assessed:       Latex Allergy:  [x]NO      []YES  Preferred Language for Healthcare:   [x]English       []other:      Pain level:  0-6/10     SUBJECTIVE:   awaiting screen to see if I can have any MRI due to past surgery. symptoms continue to come and go . Is top exercises when I start to get N/T. The sx vary daily. Much difficulty sleeping and changing positions in bed      OBJECTIVE: See eval    :      RESTRICTIONS/PRECAUTIONS: History of cervical fracture in  that required cervical fusion and Halo    Exercises/Interventions:   Exercise/Equipment Resistance/Repetitions Other comments   Pt education Reviewed head positions to decrease N/T/ pain, HEP only if no sx.   Stop if increased N/T  ,   Stretching/PROM CROM     Chin tuck    UT side bend stretch    Levater scap stretch     Scalene stretch     Pectoral stretch  reviewd diferent arm posotions for decreased N/T   Thread needle      openbook  start4/29   Isometrics     Retraction     shrugs     Cervical Flexion      Cervical Extension     Cervical sidebending               PRE's     External Rotation     Internal Rotation     Serratus  start4/29   Biceps     Triceps     Shrugs     Horizontal Abd with ER     Reverse Flys     EXT     Flexion     scaption Attempted but caused N/T         Cable Column/Theraband     Scapular Retraction     External Rotation Attempted but caused N/T    Internal Rotation     Ext     TRIC     Lats     Shrugs     Flex     BIC     PNF     Supine hor abd cerv flexion ~35*    Supine cheerleader/diagnols cerv flexion ~35*             Manual Intervention      Cerv mobs/manip NO     Thoracic mobs/manip      CT manip NO     Light cerv distraction with L SB, IASTM R UT cerival area 25 min       Therapeutic Exercise and NMR EXR  [] (83907) Provided verbal/tactile cueing for activities related to strengthening, flexibility, endurance, ROM  for improvements in cervical, postural, scapular, scapulothoracic and UE control with self care, reaching, carrying, lifting, house/yardwork, driving/computer work.    [] (37519) Provided verbal/tactile cueing for activities related to improving balance, coordination, kinesthetic sense, posture, motor skill, proprioception  to assist with cervical, scapular, scapulothoracic and UE control with self care, reaching, carrying, lifting, house/yardwork, driving/computer work.     Therapeutic Activities:    [] (51308 or 55454) Provided verbal/tactile cueing for activities related to improving balance, coordination, kinesthetic sense, posture, motor skill, proprioception and motor activation to allow for proper function of cervical, scapular, scapulothoracic and UE control with self care, carrying, lifting, driving/computer work. Home Exercise Program:    [x] (57412) Reviewed/Progressed HEP activities related to strengthening, flexibility, endurance, ROM of cervical, scapular, scapulothoracic and UE control with self care, reaching, carrying, lifting, house/yardwork, driving/computer work  [] (92618) Reviewed/Progressed HEP activities related to improving balance, coordination, kinesthetic sense, posture, motor skill, proprioception of cervical, scapular, scapulothoracic and UE control with self care, reaching, carrying, lifting, house/yardwork, driving/computer work      Manual Treatments:  PROM / STM / Oscillations-Mobs:  G-I, II, III, IV (PA's, Inf., Post.)  [x] (52635) Provided manual therapy to mobilize soft tissue/joints of cervical/CT, scapular GHJ and UE for the purpose of decreasing headache, modulating pain, promoting relaxation,  increasing ROM, reducing/eliminating soft tissue swelling/inflammation/restriction, improving soft tissue extensibility and allowing for proper ROM for normal function with self care, reaching, carrying, lifting, house/yardwork, driving/computer work    Modalities:      Charges:  Timed Code Treatment Minutes: 25   Total Treatment Minutes: 245-330       [] EVAL (LOW) 10305 (typically 20 minutes face-to-face)  [] EVAL (MOD) 18528 (typically 30 minutes face-to-face)  [] EVAL (HIGH) 06196 (typically 45 minutes face-to-face)  [] RE-EVAL     [] TS(97098) x  2   [] IONTO  [] NMR (51765) x     [] VASO  [x] Manual (18062) x  2    [] Other:  [] TA x      [] Mech Traction (75696)  [] ES(attended) (52130)      [] ES (un) (60728):       GOALS:   Patient stated goal: no pain, manage symptoms     []? Progressing: []? Met: []? Not Met: []? Adjusted     Therapist goals for Patient:   Short Term Goals: To be achieved in: 2-4 weeks  1. Independent in HEP and progression per patient tolerance, in order to prevent re-injury. []? Progressing: []? Met: []? Not Met: []? Adjusted   2.  Patient will have a decrease in pain to facilitate improvement in movement, function, and ADLs as indicated by Functional Deficits. []? Progressing: []? Met: []? Not Met: []? Adjusted     Long Term Goals: To be achieved in: *12 weeks  1. Foto score 66+/100 to assist with reaching prior level of function. []? Progressing: []? Met: []? Not Met: []? Adjusted  2. Patient will demonstrate increased AROM to max potential  of cervical/thoracic spine to allow for proper joint functioning as indicated by patients Functional Deficits. []? Progressing: []? Met: []? Not Met: []? Adjusted  Patient will demonstrate an increase in postural awareness and control and activation of  Deep cervical stabilizers to allow for proper functional mobility as indicated by patients Functional Deficits. []? Progressing: []? Met: []? Not Met: []? Adjusted  4. Patient will return to  functional activities community walking , no pain with sleep or upon waking   without increased symptoms or restriction. []? Progressing: []? Met: []? Not Met: []? Adjusted     Overall Progression Towards Functional goals/ Treatment Progress Update:  [] Patient is progressing as expected towards functional goals listed. [] Progression is slowed due to complexities/Impairments listed. [] Progression has been slowed due to co-morbidities. [x] Plan just implemented, too soon to assess goals progression <30days   [] Goals require adjustment due to lack of progress  [] Patient is not progressing as expected and requires additional follow up with physician  [] Other    Prognosis for POC: [x] Good [] Fair  [] Poor      Patient requires continued skilled intervention: [x] Yes  [] No    Treatment/Activity Tolerance:  [x] Patient able to complete treatment  [] Patient limited by fatigue  [] Patient limited by pain     [] Patient limited by other medical complications  [x] Other: trial of light cerv traction and IASTM this date. Pt to continue with HEP as tolerated. [facility] awaiting results from testing before deciding what to do next. They advised continued PT as tolerated. Patient Education:Reviewed diagnosis, POC, HEP and its importance. Reviewed work station ergonomics including use of up/down desk and avoiding prolonged cervical rotation to look at multiple screens. , stress management       Access Code: [email or web address]  URL: [email or web address]  Date: 04/25/2022  Prepared by: Oneal Cadet     Exercises  Seated Cervical Sidebending Stretch - 5 x daily - 7 x weekly - 5 reps - 30 hold  Doorway Pec Stretch at 90 Degrees Abduction - 5 x daily - 7 x weekly - 5 reps - 30 hold  Supine Shoulder Horizontal Abduction with Resistance - 2 x daily - 7 x weekly - 3 sets - 10 reps  Standing Shoulder Diagonal Horizontal Abduction 60/120 Degrees with Resistance - 1 x daily - 7 x weekly - 3 sets - 10 reps     PLAN: See eval  [x] Continue per plan of care [] Alter current plan (see comments above)  [] Plan of care initiated [] Hold pending MD visit [] Discharge      Electronically signed by:  Oneal Cadet, PT    Note: If patient does not return for scheduled/ recommended follow up visits, this note will serve as a discharge from care along with most recent update on progress.

## 2022-05-16 ENCOUNTER — HOSPITAL ENCOUNTER (OUTPATIENT)
Dept: PHYSICAL THERAPY | Age: 62
Setting detail: THERAPIES SERIES
Discharge: HOME | End: 2022-05-16
Payer: COMMERCIAL

## 2022-05-16 PROCEDURE — 97140 MANUAL THERAPY 1/> REGIONS: CPT | Performed by: PHYSICAL THERAPIST

## 2022-05-16 NOTE — FLOWSHEET NOTE
[facility], [facility] [address], [address] [place] [address]  Phone: [phone]   Fax:     [phone]                                                     Physical Therapy Daily Treatment Note  Date:  2022    Patient Name:  Celena Henry    :  1960  MRN: [id number]  Restrictions/Precautions:    Medical/Treatment Diagnosis Information:  Diagnosis: M47.9 (ICD-10-CM) - Spondylosis  Treatment Diagnosis: I48.2  Insurance/Certification information:  PT Insurance Information: [facility]  Physician Information:   Hector Rock  Has the plan of care been signed (Y/N):        []  Yes  [x]  No     Date of Patient follow up with Physician: per md.  Patient shows interest in a spinal consult with his history of cervical fusion. Is this a Progress Report:     []  Yes  [x]  No        If Yes:  Date Range for reporting period:  Beginning  Ending    Progress report will be due (10 Rx or 30 days whichever is less):       Recertification will be due (POC Duration  / 90 days whichever is less):         Visit # Insurance Allowable Auth Required   5 30 []  Yes []  No        Functional Scale: Foto 50/100   Date assessed:       Latex Allergy:  [x]NO      []YES  Preferred Language for Healthcare:   [x]English       []other:      Pain level:  0-6/10     SUBJECTIVE:   surprised how I elt during treatment last week. May have had decreased sx post treatment for a bit. Sx at night main c/o as it wakes me and is difficult to get good sleep     OBJECTIVE: See eval    :      RESTRICTIONS/PRECAUTIONS: History of cervical fracture in  that required cervical fusion and Halo    Exercises/Interventions:   Exercise/Equipment Resistance/Repetitions Other comments   Pt education Reviewed head positions to decrease N/T/ pain, HEP only if no sx.   Stop if increased N/T  ,   Stretching/PROM     CROM     Chin tuck    UT side bend stretch    Levater scap stretch     Scalene stretch     Pectoral stretch  reviewd diferent arm posotions for decreased N/T   Thread needle      openbook  start4/29   Isometrics     Retraction     shrugs     Cervical Flexion      Cervical Extension     Cervical sidebending               PRE's     External Rotation     Internal Rotation     Serratus  start4/29   Biceps     Triceps     Shrugs     Horizontal Abd with ER     Reverse Flys     EXT     Flexion     scaption Attempted but caused N/T         Cable Column/Theraband     Scapular Retraction     External Rotation Attempted but caused N/T    Internal Rotation     Ext     TRIC     Lats     Shrugs     Flex     BIC     PNF     Supine hor abd cerv flexion ~35*    Supine cheerleader/diagnols cerv flexion ~35*             Manual Intervention      Cerv mobs/manip NO     Thoracic mobs/manip      CT manip NO     Light cerv distraction with L SB, IASTM R UT cerival area 25 min       Therapeutic Exercise and NMR EXR  [] (90961) Provided verbal/tactile cueing for activities related to strengthening, flexibility, endurance, ROM  for improvements in cervical, postural, scapular, scapulothoracic and UE control with self care, reaching, carrying, lifting, house/yardwork, driving/computer work.    [] (31294) Provided verbal/tactile cueing for activities related to improving balance, coordination, kinesthetic sense, posture, motor skill, proprioception  to assist with cervical, scapular, scapulothoracic and UE control with self care, reaching, carrying, lifting, house/yardwork, driving/computer work. Therapeutic Activities:    [] (30340 or 26622) Provided verbal/tactile cueing for activities related to improving balance, coordination, kinesthetic sense, posture, motor skill, proprioception and motor activation to allow for proper function of cervical, scapular, scapulothoracic and UE control with self care, carrying, lifting, driving/computer work.      Home Exercise Program: [x] (70489) Reviewed/Progressed HEP activities related to strengthening, flexibility, endurance, ROM of cervical, scapular, scapulothoracic and UE control with self care, reaching, carrying, lifting, house/yardwork, driving/computer work  [] (99123) Reviewed/Progressed HEP activities related to improving balance, coordination, kinesthetic sense, posture, motor skill, proprioception of cervical, scapular, scapulothoracic and UE control with self care, reaching, carrying, lifting, house/yardwork, driving/computer work      Manual Treatments:  PROM / STM / Oscillations-Mobs:  G-I, II, III, IV (PA's, Inf., Post.)  [x] (79781) Provided manual therapy to mobilize soft tissue/joints of cervical/CT, scapular GHJ and UE for the purpose of decreasing headache, modulating pain, promoting relaxation,  increasing ROM, reducing/eliminating soft tissue swelling/inflammation/restriction, improving soft tissue extensibility and allowing for proper ROM for normal function with self care, reaching, carrying, lifting, house/yardwork, driving/computer work    Modalities:      Charges:  Timed Code Treatment Minutes: 25   Total Treatment Minutes: 996-648       [] EVAL (LOW) 08470 (typically 20 minutes face-to-face)  [] EVAL (MOD) 56812 (typically 30 minutes face-to-face)  [] EVAL (HIGH) 16881 (typically 45 minutes face-to-face)  [] RE-EVAL     [] DF(35504) x  2   [] IONTO  [] NMR (66001) x     [] VASO  [x] Manual (29014) x  2    [] Other:  [] TA x      [] Mech Traction (58967)  [] ES(attended) (27063)      [] ES (un) (55189):       GOALS:   Patient stated goal: no pain, manage symptoms     []? Progressing: []? Met: []? Not Met: []? Adjusted     Therapist goals for Patient:   Short Term Goals: To be achieved in: 2-4 weeks  1. Independent in HEP and progression per patient tolerance, in order to prevent re-injury. []? Progressing: []? Met: []? Not Met: []? Adjusted   2.  Patient will have a decrease in pain to facilitate improvement in movement, function, and ADLs as indicated by Functional Deficits. []? Progressing: []? Met: []? Not Met: []? Adjusted     Long Term Goals: To be achieved in: *12 weeks  1. Foto score 66+/100 to assist with reaching prior level of function. []? Progressing: []? Met: []? Not Met: []? Adjusted  2. Patient will demonstrate increased AROM to max potential  of cervical/thoracic spine to allow for proper joint functioning as indicated by patients Functional Deficits. []? Progressing: []? Met: []? Not Met: []? Adjusted  Patient will demonstrate an increase in postural awareness and control and activation of  Deep cervical stabilizers to allow for proper functional mobility as indicated by patients Functional Deficits. []? Progressing: []? Met: []? Not Met: []? Adjusted  4. Patient will return to  functional activities community walking , no pain with sleep or upon waking   without increased symptoms or restriction. []? Progressing: []? Met: []? Not Met: []? Adjusted     Overall Progression Towards Functional goals/ Treatment Progress Update:  [] Patient is progressing as expected towards functional goals listed. [] Progression is slowed due to complexities/Impairments listed. [] Progression has been slowed due to co-morbidities. [x] Plan just implemented, too soon to assess goals progression <30days   [] Goals require adjustment due to lack of progress  [] Patient is not progressing as expected and requires additional follow up with physician  [] Other    Prognosis for POC: [x] Good [] Fair  [] Poor      Patient requires continued skilled intervention: [x] Yes  [] No    Treatment/Activity Tolerance:  [x] Patient able to complete treatment  [] Patient limited by fatigue  [] Patient limited by pain     [] Patient limited by other medical complications  [x] Other:   Pt to continue with HEP as tolerated. [facility] awaiting results from testing before deciding what to do next.   They advised continued PT as tolerated. Patient Education:Reviewed diagnosis, POC, HEP and its importance. Reviewed work station ergonomics including use of up/down desk and avoiding prolonged cervical rotation to look at multiple screens. , stress management       Access Code: [email or web address]  URL: [email or web address]  Date: 04/25/2022  Prepared by: Quintin Abreu     Exercises  Seated Cervical Sidebending Stretch - 5 x daily - 7 x weekly - 5 reps - 30 hold  Doorway Pec Stretch at 90 Degrees Abduction - 5 x daily - 7 x weekly - 5 reps - 30 hold  Supine Shoulder Horizontal Abduction with Resistance - 2 x daily - 7 x weekly - 3 sets - 10 reps  Standing Shoulder Diagonal Horizontal Abduction 60/120 Degrees with Resistance - 1 x daily - 7 x weekly - 3 sets - 10 reps     PLAN: See eval  [x] Continue per plan of care [] Alter current plan (see comments above)  [] Plan of care initiated [] Hold pending MD visit [] Discharge      Electronically signed by:  Quintin Abreu PT    Note: If patient does not return for scheduled/ recommended follow up visits, this note will serve as a discharge from care along with most recent update on progress.

## 2022-05-23 ENCOUNTER — HOSPITAL ENCOUNTER (OUTPATIENT)
Dept: PHYSICAL THERAPY | Age: 62
Setting detail: THERAPIES SERIES
Discharge: HOME | End: 2022-05-23
Payer: COMMERCIAL

## 2022-05-23 PROCEDURE — 97140 MANUAL THERAPY 1/> REGIONS: CPT | Performed by: PHYSICAL THERAPIST

## 2022-05-23 NOTE — FLOWSHEET NOTE
[facility], [facility] [place] [address], [address] [place] [address]  Phone: [phone]   Fax:     [phone]                                                     Physical Therapy Daily Treatment Note  Date:  2022    Patient Name:  Dimitry Cowan    :  1960  MRN: [id number]  Restrictions/Precautions:    Medical/Treatment Diagnosis Information:  Diagnosis: M47.9 (ICD-10-CM) - Spondylosis  Treatment Diagnosis: Y08.2  Insurance/Certification information:  PT Insurance Information: BC  Physician Information:   Keily Quant  Has the plan of care been signed (Y/N):        []  Yes  [x]  No     Date of Patient follow up with Physician: per md.  Patient shows interest in a spinal consult with his history of cervical fusion. Is this a Progress Report:     []  Yes  [x]  No        If Yes:  Date Range for reporting period:  Beginning  Ending    Progress report will be due (10 Rx or 30 days whichever is less):       Recertification will be due (POC Duration  / 90 days whichever is less):         Visit # Insurance Allowable Auth Required   6 30 []  Yes []  No        Functional Scale: Foto 50/100   Date assessed:       Latex Allergy:  [x]NO      []YES  Preferred Language for Healthcare:   [x]English       []other:      Pain level:  0-6/10     SUBJECTIVE:   no change.  have MRI scheduled for next Friday     OBJECTIVE: See eval    :      RESTRICTIONS/PRECAUTIONS: History of cervical fracture in  that required cervical fusion and Halo    Exercises/Interventions:   Exercise/Equipment Resistance/Repetitions Other comments   Pt education Reviewed head positions to decrease N/T/ pain, HEP only if no sx.   Stop if increased N/T  ,   Stretching/PROM     CROM     Chin tuck    UT side bend stretch    Levater scap stretch     Scalene stretch     Pectoral stretch  reviewd diferent arm posotions for decreased N/T   Thread needle      openbook  start4/29   Isometrics     Retraction     shrugs     Cervical Flexion      Cervical Extension     Cervical sidebending               PRE's     External Rotation     Internal Rotation     Serratus  start4/29   Biceps     Triceps     Shrugs     Horizontal Abd with ER     Reverse Flys     EXT     Flexion     scaption Attempted but caused N/T         Cable Column/Theraband     Scapular Retraction     External Rotation Attempted but caused N/T    Internal Rotation     Ext     TRIC     Lats     Shrugs     Flex     BIC     PNF     Supine hor abd cerv flexion ~35*    Supine cheerleader/diagnols cerv flexion ~35*             Manual Intervention      Cerv mobs/manip NO     Thoracic mobs/manip      CT manip NO     Light cerv distraction with L SB, IASTM R UT cerival area 25 min       Therapeutic Exercise and NMR EXR  [] (27454) Provided verbal/tactile cueing for activities related to strengthening, flexibility, endurance, ROM  for improvements in cervical, postural, scapular, scapulothoracic and UE control with self care, reaching, carrying, lifting, house/yardwork, driving/computer work.    [] (64042) Provided verbal/tactile cueing for activities related to improving balance, coordination, kinesthetic sense, posture, motor skill, proprioception  to assist with cervical, scapular, scapulothoracic and UE control with self care, reaching, carrying, lifting, house/yardwork, driving/computer work. Therapeutic Activities:    [] (69067 or 68696) Provided verbal/tactile cueing for activities related to improving balance, coordination, kinesthetic sense, posture, motor skill, proprioception and motor activation to allow for proper function of cervical, scapular, scapulothoracic and UE control with self care, carrying, lifting, driving/computer work.      Home Exercise Program:    [x] (52505) Reviewed/Progressed HEP activities related to strengthening, flexibility, endurance, ROM of cervical, scapular, scapulothoracic and UE control with self care, reaching, carrying, lifting, house/yardwork, driving/computer work  [] (60138) Reviewed/Progressed HEP activities related to improving balance, coordination, kinesthetic sense, posture, motor skill, proprioception of cervical, scapular, scapulothoracic and UE control with self care, reaching, carrying, lifting, house/yardwork, driving/computer work      Manual Treatments:  PROM / STM / Oscillations-Mobs:  G-I, II, III, IV (PA's, Inf., Post.)  [x] (74823) Provided manual therapy to mobilize soft tissue/joints of cervical/CT, scapular GHJ and UE for the purpose of decreasing headache, modulating pain, promoting relaxation,  increasing ROM, reducing/eliminating soft tissue swelling/inflammation/restriction, improving soft tissue extensibility and allowing for proper ROM for normal function with self care, reaching, carrying, lifting, house/yardwork, driving/computer work    Modalities:      Charges:  Timed Code Treatment Minutes: 25   Total Treatment Minutes: 250-320       [] EVAL (LOW) 16810 (typically 20 minutes face-to-face)  [] EVAL (MOD) 29641 (typically 30 minutes face-to-face)  [] EVAL (HIGH) 423 8935 (typically 45 minutes face-to-face)  [] RE-EVAL     [] QX(54259) x  2   [] IONTO  [] NMR (94374) x     [] VASO  [x] Manual (99499) x  2    [] Other:  [] TA x      [] Mech Traction (41738)  [] ES(attended) (66280)      [] ES (un) (66493):       GOALS:   Patient stated goal: no pain, manage symptoms     []? Progressing: []? Met: []? Not Met: []? Adjusted     Therapist goals for Patient:   Short Term Goals: To be achieved in: 2-4 weeks  1. Independent in HEP and progression per patient tolerance, in order to prevent re-injury. []? Progressing: []? Met: []? Not Met: []? Adjusted   2. Patient will have a decrease in pain to facilitate improvement in movement, function, and ADLs as indicated by Functional Deficits. []? Progressing: []? Met: []?  Not Met: []? Adjusted     Long Term Goals: To be achieved in: *12 weeks  1. Foto score 66+/100 to assist with reaching prior level of function. []? Progressing: []? Met: []? Not Met: []? Adjusted  2. Patient will demonstrate increased AROM to max potential  of cervical/thoracic spine to allow for proper joint functioning as indicated by patients Functional Deficits. []? Progressing: []? Met: []? Not Met: []? Adjusted  Patient will demonstrate an increase in postural awareness and control and activation of  Deep cervical stabilizers to allow for proper functional mobility as indicated by patients Functional Deficits. []? Progressing: []? Met: []? Not Met: []? Adjusted  4. Patient will return to  functional activities community walking , no pain with sleep or upon waking   without increased symptoms or restriction. []? Progressing: []? Met: []? Not Met: []? Adjusted     Overall Progression Towards Functional goals/ Treatment Progress Update:  [] Patient is progressing as expected towards functional goals listed. [] Progression is slowed due to complexities/Impairments listed. [] Progression has been slowed due to co-morbidities. [x] Plan just implemented, too soon to assess goals progression <30days   [] Goals require adjustment due to lack of progress  [] Patient is not progressing as expected and requires additional follow up with physician  [] Other    Prognosis for POC: [x] Good [] Fair  [] Poor      Patient requires continued skilled intervention: [x] Yes  [] No    Treatment/Activity Tolerance:  [x] Patient able to complete treatment  [] Patient limited by fatigue  [] Patient limited by pain     [] Patient limited by other medical complications  [x] Other:   Pt to continue with HEP as tolerated. Pt will hold on PT until MRI results are in. Patient Education:Reviewed diagnosis, POC, HEP and its importance.  Reviewed work station ergonomics including use of [place] and avoiding prolonged cervical rotation to look at multiple screens. , stress management       Access Code: [email or web address]  URL: [email or web address]  Date: 04/25/2022  Prepared by: Tayler Erickson     Exercises  Seated Cervical Sidebending Stretch - 5 x daily - 7 x weekly - 5 reps - 30 hold  Doorway Pec Stretch at 90 Degrees Abduction - 5 x daily - 7 x weekly - 5 reps - 30 hold  Supine Shoulder Horizontal Abduction with Resistance - 2 x daily - 7 x weekly - 3 sets - 10 reps  Standing Shoulder Diagonal Horizontal Abduction 60/120 Degrees with Resistance - 1 x daily - 7 x weekly - 3 sets - 10 reps     PLAN: See eval  [x] Continue per plan of care [] Alter current plan (see comments above)  [] Plan of care initiated [] Hold pending MD visit [] Discharge      Electronically signed by:  Tayler Erickson, PT    Note: If patient does not return for scheduled/ recommended follow up visits, this note will serve as a discharge from care along with most recent update on progress.

## 2022-06-03 ENCOUNTER — HOSPITAL ENCOUNTER (OUTPATIENT)
Dept: MRI IMAGING | Age: 62
Discharge: HOME | End: 2022-06-03
Payer: COMMERCIAL

## 2022-06-03 DIAGNOSIS — M47.813 SPONDYLOSIS OF CERVICOTHORACIC REGION WITHOUT MYELOPATHY OR RADICULOPATHY: ICD-10-CM

## 2022-06-03 DIAGNOSIS — M47.812 CERVICAL SPONDYLOSIS: ICD-10-CM

## 2022-06-03 PROCEDURE — 72141 MRI NECK SPINE W/O DYE: CPT

## 2022-09-22 ASSESSMENT — PATIENT HEALTH QUESTIONNAIRE - PHQ9
SUM OF ALL RESPONSES TO PHQ9 QUESTIONS 1 & 2: 0
1. LITTLE INTEREST OR PLEASURE IN DOING THINGS: 0
2. FEELING DOWN, DEPRESSED OR HOPELESS: NOT AT ALL
SUM OF ALL RESPONSES TO PHQ9 QUESTIONS 1 & 2: 0
2. FEELING DOWN, DEPRESSED OR HOPELESS: 0
SUM OF ALL RESPONSES TO PHQ QUESTIONS 1-9: 0
1. LITTLE INTEREST OR PLEASURE IN DOING THINGS: NOT AT ALL
SUM OF ALL RESPONSES TO PHQ QUESTIONS 1-9: 0

## 2022-09-26 ENCOUNTER — OFFICE VISIT (OUTPATIENT)
Dept: FAMILY MEDICINE CLINIC | Age: 62
End: 2022-09-26
Payer: COMMERCIAL

## 2022-09-26 VITALS
DIASTOLIC BLOOD PRESSURE: 86 MMHG | HEIGHT: 76 IN | SYSTOLIC BLOOD PRESSURE: 134 MMHG | OXYGEN SATURATION: 97 % | HEART RATE: 59 BPM | WEIGHT: 235.6 LBS | BODY MASS INDEX: 28.69 KG/M2

## 2022-09-26 DIAGNOSIS — R73.01 IMPAIRED FASTING GLUCOSE: ICD-10-CM

## 2022-09-26 DIAGNOSIS — Z00.00 WELL ADULT EXAM: Primary | ICD-10-CM

## 2022-09-26 DIAGNOSIS — Z01.818 PREOP EXAMINATION: ICD-10-CM

## 2022-09-26 PROBLEM — M47.813 SPONDYLOSIS WITHOUT MYELOPATHY OR RADICULOPATHY, CERVICOTHORACIC REGION: Status: ACTIVE | Noted: 2022-05-02

## 2022-09-26 PROBLEM — M47.812 SPONDYLOSIS WITHOUT MYELOPATHY OR RADICULOPATHY, CERVICAL REGION: Status: ACTIVE | Noted: 2022-05-02

## 2022-09-26 PROBLEM — M54.12 RADICULOPATHY, CERVICAL REGION: Status: ACTIVE | Noted: 2022-06-17

## 2022-09-26 PROCEDURE — 93000 ELECTROCARDIOGRAM COMPLETE: CPT | Performed by: FAMILY MEDICINE

## 2022-09-26 PROCEDURE — 99396 PREV VISIT EST AGE 40-64: CPT | Performed by: FAMILY MEDICINE

## 2022-09-26 RX ORDER — OMEGA-3/DHA/EPA/FISH OIL 60 MG-90MG
CAPSULE ORAL
COMMUNITY
End: 2022-11-03

## 2022-09-26 RX ORDER — ZINC GLUCONATE 50 MG
50 TABLET ORAL DAILY
COMMUNITY
End: 2022-11-03

## 2022-09-26 RX ORDER — MULTIVIT-MIN/IRON/FOLIC ACID/K 18-600-40
CAPSULE ORAL
COMMUNITY
End: 2022-11-03

## 2022-09-26 SDOH — ECONOMIC STABILITY: FOOD INSECURITY: WITHIN THE PAST 12 MONTHS, YOU WORRIED THAT YOUR FOOD WOULD RUN OUT BEFORE YOU GOT MONEY TO BUY MORE.: NEVER TRUE

## 2022-09-26 SDOH — ECONOMIC STABILITY: FOOD INSECURITY: WITHIN THE PAST 12 MONTHS, THE FOOD YOU BOUGHT JUST DIDN'T LAST AND YOU DIDN'T HAVE MONEY TO GET MORE.: NEVER TRUE

## 2022-09-26 SDOH — HEALTH STABILITY: PHYSICAL HEALTH: ON AVERAGE, HOW MANY DAYS PER WEEK DO YOU ENGAGE IN MODERATE TO STRENUOUS EXERCISE (LIKE A BRISK WALK)?: 3 DAYS

## 2022-09-26 SDOH — HEALTH STABILITY: PHYSICAL HEALTH: ON AVERAGE, HOW MANY MINUTES DO YOU ENGAGE IN EXERCISE AT THIS LEVEL?: 30 MIN

## 2022-09-26 ASSESSMENT — SOCIAL DETERMINANTS OF HEALTH (SDOH)
WITHIN THE LAST YEAR, HAVE YOU BEEN AFRAID OF YOUR PARTNER OR EX-PARTNER?: NO
HOW HARD IS IT FOR YOU TO PAY FOR THE VERY BASICS LIKE FOOD, HOUSING, MEDICAL CARE, AND HEATING?: NOT HARD AT ALL
WITHIN THE LAST YEAR, HAVE TO BEEN RAPED OR FORCED TO HAVE ANY KIND OF SEXUAL ACTIVITY BY YOUR PARTNER OR EX-PARTNER?: NO
WITHIN THE LAST YEAR, HAVE YOU BEEN HUMILIATED OR EMOTIONALLY ABUSED IN OTHER WAYS BY YOUR PARTNER OR EX-PARTNER?: NO
WITHIN THE LAST YEAR, HAVE YOU BEEN KICKED, HIT, SLAPPED, OR OTHERWISE PHYSICALLY HURT BY YOUR PARTNER OR EX-PARTNER?: NO

## 2022-09-26 ASSESSMENT — ENCOUNTER SYMPTOMS: RESPIRATORY NEGATIVE: 1

## 2022-09-26 NOTE — PROGRESS NOTES
Codey Roa (:  1960) is a 63 y.o. male,Established patient, here for evaluation of the following chief complaint(s):  New Patient (Patient needs to establish with new PCP, upcoming surgery - has preop appt scheduled. Dealing with pain related to pinched nerve. )         ASSESSMENT/PLAN:  Marc Gold was seen today for new patient. Diagnoses and all orders for this visit:    Well adult exam  -     Lipid Panel; Future  -     Basic Metabolic Panel; Future  -     CBC; Future  Good diet and exercise  Preop examination  -     Basic Metabolic Panel; Future  -     CBC; Future  -     EKG 12 lead  Ekg nsr  Medically patient is at acceptable risk to undergo planned surgery. Impaired fasting glucose  -     Hemoglobin A1C; Future   A1c to see if stable    No follow-ups on file. Subjective   SUBJECTIVE/OBJECTIVE:  HPI  Pt is a of 63 y.o. male comes in today with   Chief Complaint   Patient presents with    New Patient     Patient needs to establish with new PCP, upcoming surgery - has preop appt scheduled. Dealing with pain related to pinched nerve. Failed neck surgery from years before. Will be having surgery by Dr. Randall Bar. Failed PT and STACI (helped but temporary). No personal or family history of adverse reaction to anesthesia or bleeding tendency.      Vitals:    22 0819   BP: 134/86   Site: Left Upper Arm   Position: Sitting   Cuff Size: Medium Adult   Pulse: 59   SpO2: 97%   Weight: 235 lb 9.6 oz (106.9 kg)   Height: 6' 4\" (1.93 m)   No Known Allergies    Current Outpatient Medications on File Prior to Visit   Medication Sig Dispense Refill    Omega-3 Fatty Acids (FISH OIL) 500 MG CAPS Take by mouth      zinc gluconate 50 MG tablet Take 50 mg by mouth daily      Cholecalciferol (VITAMIN D) 50 MCG (2000) CAPS capsule Take by mouth      aspirin 81 MG EC tablet Take 81 mg by mouth daily      niacin (NIASPAN) 500 MG extended release tablet Take 1 tablet by mouth       lansoprazole (PREVACID) 15 MG delayed release capsule Take 1 capsule by mouth       No current facility-administered medications on file prior to visit. Past Medical History:   Diagnosis Date    GERD (gastroesophageal reflux disease)     Hypercholesterolemia     Sleep apnea     Testicular hypofunction        Past Surgical History:   Procedure Laterality Date    ACHILLES TENDON SURGERY Right     CERVICAL DISC SURGERY  12/01/1984    COLONOSCOPY N/A 4/22/2021    COLONOSCOPY POLYPECTOMY SNARE/COLD BIOPSY performed by Osbaldo Sanchez MD at [address] [place] [address] History     Socioeconomic History    Marital status:      Spouse name: None    Number of children: None    Years of education: None    Highest education level: None   Tobacco Use    Smoking status: Never    Smokeless tobacco: Never   Vaping Use    Vaping Use: Never used   Substance and Sexual Activity    Alcohol use: Not Currently     Comment: I have an occasional drink (quite rarely). Drug use: Never    Sexual activity: Yes     Partners: Female     Comment: Wife only. Social Determinants of Health     Financial Resource Strain: Low Risk     Difficulty of Paying Living Expenses: Not hard at all   Food Insecurity: No Food Insecurity    Worried About Running Out of Food in the Last Year: Never true    Ran Out of Food in the Last Year: Never true   Physical Activity: Insufficiently Active    Days of Exercise per Week: 3 days    Minutes of Exercise per Session: 30 min   Intimate Partner Violence: Not At Risk    Fear of Current or Ex-Partner: No    Emotionally Abused: No    Physically Abused: No    Sexually Abused: No       Social History     Substance and Sexual Activity   Drug Use Never       Family History   Problem Relation Age of Onset    Cancer Mother         Mother - pancreatic    Heart Disease Father         Father    Heart Disease Paternal Uncle     Heart Attack Paternal Uncle         Uncle      Review of Systems   Constitutional: Negative. Respiratory: Negative. Cardiovascular: Negative. Hematological:  Does not bruise/bleed easily. Objective   Physical Exam  Constitutional:       General: He is not in acute distress. Appearance: Normal appearance. He is well-developed. He is not diaphoretic. HENT:      Head: Normocephalic and atraumatic. Mouth/Throat:      Mouth: Mucous membranes are moist.      Pharynx: Oropharynx is clear. Eyes:      General: No scleral icterus. Neck:      Thyroid: No thyromegaly. Trachea: No tracheal deviation. Cardiovascular:      Rate and Rhythm: Normal rate and regular rhythm. Heart sounds: Normal heart sounds. No murmur heard. Pulmonary:      Effort: Pulmonary effort is normal.      Breath sounds: Normal breath sounds. No wheezing or rales. Musculoskeletal:      Cervical back: Normal range of motion and neck supple. Lymphadenopathy:      Head:      Right side of head: No submental or submandibular adenopathy. Left side of head: No submental or submandibular adenopathy. Cervical: No cervical adenopathy. Skin:     General: Skin is warm and dry. Capillary Refill: Capillary refill takes less than 2 seconds. Neurological:      Mental Status: He is alert and oriented to person, place, and time. Cranial Nerves: No cranial nerve deficit. Psychiatric:         Behavior: Behavior normal.         Thought Content: Thought content normal.         Judgment: Judgment normal.            An electronic signature was used to authenticate this note.     --Alma Dickens MD

## 2022-10-10 RX ORDER — ASCORBIC ACID 500 MG
1000 TABLET ORAL DAILY
COMMUNITY
End: 2022-11-03

## 2022-10-10 RX ORDER — IBUPROFEN 200 MG
200 TABLET ORAL EVERY 6 HOURS PRN
COMMUNITY
End: 2022-11-03

## 2022-10-10 NOTE — PROGRESS NOTES
Name_______________________________________Printed:____________________  Date and time of surgery__10/18 0715______________________Arrival Time:___0600_____________   1. The instructions given regarding when and if a patient needs to stop oral intake prior to surgery varies. Follow the specific instructions you were given                  __x_Nothing to eat or to drink after Midnight the night before.                   ____Carbo loading or ERAS instructions will be given to select patients-if you have been given those instructions -please do the following                           The evening before your surgery after dinner before midnight drink 40 ounces of gatorade. If you are diabetic use sugar free. The morning of surgery drink 40 ounces of water. This needs to be finished 3 hours prior to your surgery start time. 2. Take the following pills with a small sip of water on the morning of surgery__prevacid if needed_________________________________________________                  Do not take blood pressure medications ending in pril or sartan the arianna prior to surgery or the morning of surgery_   3. Aspirin, Ibuprofen, Advil, Naproxen, Vitamin E and other Anti-inflammatory products and supplements should be stopped for 5 -7days before surgery or as directed by your physician. 4. Check with your Doctor regarding stopping Plavix, Coumadin,Eliquis, Lovenox,Effient,Pradaxa,Xarelto, Fragmin or other blood thinners and follow their instructions. 5. Do not smoke, and do not drink any alcoholic beverages 24 hours prior to surgery. This includes NA Beer. Refrain from the usage of any recreational drugs. 6. You may brush your teeth and gargle the morning of surgery. DO NOT SWALLOW WATER   7. You MUST make arrangements for a responsible adult to stay on site while you are here and take you home after your surgery. You will not be allowed to leave alone or drive yourself home.   It is strongly suggested someone stay with you the first 24 hrs. Your surgery will be cancelled if you do not have a ride home. 8. A parent/legal guardian must accompany a child scheduled for surgery and plan to stay at the hospital until the child is discharged. Please do not bring other children with you. 9. Please wear simple, loose fitting clothing to the hospital.  Ben Raímrez not bring valuables (money, credit cards, checkbooks, etc.) Do not wear any makeup (including no eye makeup) or nail polish on your fingers or toes. 10. DO NOT wear any jewelry or piercings on day of surgery. All body piercing jewelry must be removed. 11. If you have ___dentures, they will be removed before going to the OR; we will provide you a container. If you wear ___contact lenses or ___glasses, they will be removed; please bring a case for them. 12. Please see your family doctor/pediatrician for a history & physical and/or concerning medications. Bring any test results/reports from your physician's office. PCP__________________Phone___________H&P Appt. Date________             13 If you  have a Living Will and Durable Power of  for Healthcare, please bring in a copy. 15. Notify your Surgeon if you develop any illness between now and surgery  time, cough, cold, fever, sore throat, nausea, vomiting, etc.  Please notify your surgeon if you experience dizziness, shortness of breath or blurred vision between now & the time of your surgery             15. DO NOT shave your operative site 96 hours prior to surgery. For face & neck surgery, men may use an electric razor 48 hours prior to surgery. 16. Shower the night before or morning of surgery using an antibacterial soap or as you have been instructed. 17. To provide excellent care visitors will be limited to one in the room at any given time. 18.  Please bring picture ID and insurance card.              19.  Visit our web site for additional information:  [email or web address]              20.During flu season no children under the age of 15 are permitted in the hospital for the safety of all patients. 21. If you take a long acting insulin in the evening only  take half of your usual  dose the night  before your procedure              22. If you use a c-pap please bring DOS if staying overnight,             23.For your convenience [id number] [place] [address] has a pharmacy on site to fill your prescriptions. 24. If you use oxygen and have a portable tank please bring it  with you the DOS             25. Bring a complete list of all your medications with name and dose include any supplements. 26. Other____PCP PATs both done______________________________________   *Please call pre admission testing if you any further questions   Fredy Nieto         [phone]   Meena Carbonean   [address] [place] [address]  [phone]   [address] [place] [address]       VISITOR POLICY(subject to change)    Current policy is 2 visitors per patient. No children. A mask is required. Visiting hours are 8a-8p. Overnight visitors will be at the discretion of the nurse. All above information reviewed with patient in person or by phone. Patient verbalizes understanding. All questions and concerns addressed.                                                                                                  Patient/Rep__per phone/pt__________________                                                                                                                                    PRE OP INSTRUCTIONS

## 2022-11-03 ENCOUNTER — OFFICE VISIT (OUTPATIENT)
Dept: FAMILY MEDICINE CLINIC | Age: 62
End: 2022-11-03
Payer: COMMERCIAL

## 2022-11-03 VITALS
HEART RATE: 60 BPM | OXYGEN SATURATION: 97 % | HEIGHT: 76 IN | WEIGHT: 234 LBS | BODY MASS INDEX: 28.49 KG/M2 | DIASTOLIC BLOOD PRESSURE: 90 MMHG | SYSTOLIC BLOOD PRESSURE: 140 MMHG

## 2022-11-03 DIAGNOSIS — M47.812 SPONDYLOSIS WITHOUT MYELOPATHY OR RADICULOPATHY, CERVICAL REGION: ICD-10-CM

## 2022-11-03 DIAGNOSIS — Z01.818 PREOP EXAMINATION: ICD-10-CM

## 2022-11-03 DIAGNOSIS — Z01.818 PREOP EXAMINATION: Primary | ICD-10-CM

## 2022-11-03 LAB
ANION GAP SERPL CALCULATED.3IONS-SCNC: 8 MMOL/L (ref 3–16)
APTT: 35.3 SEC (ref 23–34.3)
BUN BLDV-MCNC: 15 MG/DL (ref 7–20)
CALCIUM SERPL-MCNC: 9.1 MG/DL (ref 8.3–10.6)
CHLORIDE BLD-SCNC: 102 MMOL/L (ref 99–110)
CO2: 29 MMOL/L (ref 21–32)
CREAT SERPL-MCNC: 0.9 MG/DL (ref 0.8–1.3)
GFR SERPL CREATININE-BSD FRML MDRD: >60 ML/MIN/{1.73_M2}
GLUCOSE BLD-MCNC: 105 MG/DL (ref 70–99)
HCT VFR BLD CALC: 43.6 % (ref 40.5–52.5)
HEMOGLOBIN: 15 G/DL (ref 13.5–17.5)
INR BLD: 1.06 (ref 0.87–1.14)
MCH RBC QN AUTO: 30 PG (ref 26–34)
MCHC RBC AUTO-ENTMCNC: 34.3 G/DL (ref 31–36)
MCV RBC AUTO: 87.3 FL (ref 80–100)
PDW BLD-RTO: 13.5 % (ref 12.4–15.4)
PLATELET # BLD: 214 K/UL (ref 135–450)
PMV BLD AUTO: 8.1 FL (ref 5–10.5)
POTASSIUM SERPL-SCNC: 4.4 MMOL/L (ref 3.5–5.1)
PROTHROMBIN TIME: 13.7 SEC (ref 11.7–14.5)
RBC # BLD: 5 M/UL (ref 4.2–5.9)
SODIUM BLD-SCNC: 139 MMOL/L (ref 136–145)
WBC # BLD: 8.8 K/UL (ref 4–11)

## 2022-11-03 PROCEDURE — 99212 OFFICE O/P EST SF 10 MIN: CPT | Performed by: FAMILY MEDICINE

## 2022-11-03 ASSESSMENT — ENCOUNTER SYMPTOMS: RESPIRATORY NEGATIVE: 1

## 2022-11-03 NOTE — PROGRESS NOTES
Sridevi Hill (:  1960) is a 63 y.o. male,Established patient, here for evaluation of the following chief complaint(s):  Pre-op Exam (Patient is scheduled for CERVICAL6-CERVICAL7 ANTERIOR CERVICAL DISCECTOMY AND FUSION with Dr. Joesph Palacios at [facility] on 22. Previously cancelled, rescheduled. )         ASSESSMENT/PLAN:  Gisell Abbott was seen today for pre-op exam.    Diagnoses and all orders for this visit:    Preop examination    Spondylosis without myelopathy or radiculopathy, cervical region     Medically patient is at acceptable risk to undergo planned surgery. Failed conservative management. No follow-ups on file. Subjective   SUBJECTIVE/OBJECTIVE:  HPI  Pt is a of 63 y.o. male comes in today with   Chief Complaint   Patient presents with    Pre-op Exam     Patient is scheduled for CERVICAL6-CERVICAL7 ANTERIOR CERVICAL DISCECTOMY AND FUSION with Dr. Joesph Palacios at [facility] on 22. Previously cancelled, rescheduled. Failed neck surgery from years before. Will be having surgery by Dr. Joesph Palacios. Failed PT and STACI (helped but temporary). No personal or family history of adverse reaction to anesthesia or bleeding tendency. Vitals:    22 0858   BP: (!) 140/90   Site: Left Upper Arm   Position: Sitting   Cuff Size: Medium Adult   Pulse: 60   SpO2: 97%   Weight: 234 lb (106.1 kg)   Height: 6' 4\" (1.93 m)     No Known Allergies    Current Outpatient Medications on File Prior to Visit   Medication Sig Dispense Refill    lansoprazole (PREVACID) 15 MG delayed release capsule Take 1 capsule by mouth       No current facility-administered medications on file prior to visit.        Past Medical History:   Diagnosis Date    GERD (gastroesophageal reflux disease)     Hypercholesterolemia     Sleep apnea     Testicular hypofunction        Past Surgical History:   Procedure Laterality Date    ACHILLES TENDON SURGERY Right     CERVICAL [address] SURGERY  1984    COLONOSCOPY N/A 4/22/2021    COLONOSCOPY POLYPECTOMY SNARE/COLD BIOPSY performed by Trung Padilla MD at [address] History     Socioeconomic History    Marital status:      Spouse name: None    Number of children: None    Years of education: None    Highest education level: None   Tobacco Use    Smoking status: Never    Smokeless tobacco: Never   Vaping Use    Vaping Use: Never used   Substance and Sexual Activity    Alcohol use: Not Currently     Comment: I have an occasional drink (quite rarely). Drug use: Never    Sexual activity: Yes     Partners: Female     Comment: Wife only. Social Determinants of Health     Financial Resource Strain: Low Risk     Difficulty of Paying Living Expenses: Not hard at all   Food Insecurity: No Food Insecurity    Worried About Running Out of Food in the Last Year: Never true    Ran Out of Food in the Last Year: Never true   Physical Activity: Insufficiently Active    Days of Exercise per Week: 3 days    Minutes of Exercise per Session: 30 min   Intimate Partner Violence: Not At Risk    Fear of Current or Ex-Partner: No    Emotionally Abused: No    Physically Abused: No    Sexually Abused: No       Social History     Substance and Sexual Activity   Drug Use Never       Family History   Problem Relation Age of Onset    Cancer Mother         Mother - pancreatic    Heart Disease Father         Father    Heart Disease Paternal Uncle     Heart Attack Paternal Uncle         Uncle      Review of Systems   Constitutional: Negative. Respiratory: Negative. Cardiovascular: Negative. Hematological:  Does not bruise/bleed easily. Objective   Physical Exam  Constitutional:       General: He is not in acute distress. Appearance: Normal appearance. He is well-developed. He is not diaphoretic. HENT:      Head: Normocephalic and atraumatic. Mouth/Throat:      Mouth: Mucous membranes are moist.      Pharynx: Oropharynx is clear.    Eyes:      General: No scleral icterus. Neck:      Thyroid: No thyromegaly. Trachea: No tracheal deviation. Cardiovascular:      Rate and Rhythm: Normal rate and regular rhythm. Heart sounds: Normal heart sounds. No murmur heard. Pulmonary:      Effort: Pulmonary effort is normal.      Breath sounds: Normal breath sounds. No wheezing or rales. Musculoskeletal:      Cervical back: Normal range of motion and neck supple. Lymphadenopathy:      Head:      Right side of head: No submental or submandibular adenopathy. Left side of head: No submental or submandibular adenopathy. Cervical: No cervical adenopathy. Skin:     General: Skin is warm and dry. Neurological:      Mental Status: He is alert and oriented to person, place, and time. Cranial Nerves: No cranial nerve deficit. Psychiatric:         Behavior: Behavior normal.         Thought Content: Thought content normal.         Judgment: Judgment normal.            An electronic signature was used to authenticate this note.     --Fredrick Galvan MD

## 2022-11-23 NOTE — PROGRESS NOTES
Patient not reached. Preop instructions left on voice mail. [email or web address]_[phone]______________    -Date__11/29/2022_____time_1245______arrival__1115__main________  -Nothing to eat or drink after midnight  -Responsible adult 25 or older to stay on site while you are here and drive you home and stay with you after  -Follow any instructions your doctors office has given you  -Bring a complete list of all your medications and supplements  -If you normally take the following medications in the morning please do so with a small    sip of water-heart,blood pressure,seizure,breathing or thyroid-avoid water pilll Do not take blood pressure medications ending in \"german\" or \"pril\" the AM of surgery or the arianna prior  -You may use your inhalers  -Take half of your normal dose of any long acting insulins the night before-do not take    any diabetic medications in the morning  -Follow your doctors instructions regarding blood thinners  -Any questions call your surgeons office            VISITOR POLICY(subject to change)    Current policy is 2 visitors per patient. No children. Masks at discretion of facility. Visiting hours are 8a-8p. Overnight visitors will be at the discretion of the nurse. All policies are subject to change.

## 2022-11-28 NOTE — PROGRESS NOTES
Patient called in. Stated no messages received. Reviewed chart. All Pat completed. H&P  on 11/3/2022. Instructions reviewed amd confirmed surgery time and arrival @ 1115 on 11/29/2022.

## 2022-11-28 NOTE — PROGRESS NOTES
Name_______________________________________Printed:____________________  Date and time of surgery_11/29/2022_______1245________________Arrival Time:__1115__main____________   1. The instructions given regarding when and if a patient needs to stop oral intake prior to surgery varies. Follow the specific instructions you were given                  _x__Nothing to eat or to drink after Midnight the night before.                   ____Carbo loading or ERAS instructions will be given to select patients-if you have been given those instructions -please do the following                           The evening before your surgery after dinner before midnight drink 40 ounces of gatorade. If you are diabetic use sugar free. The morning of surgery drink 40 ounces of water. This needs to be finished 3 hours prior to your surgery start time. 2. Take the following pills with a small sip of water on the morning of surgery__prevacid_________________________________________________                  Do not take blood pressure medications ending in pril or sartan the arianna prior to surgery or the morning of surgery. Dr Ninfa Hickey patient are not to take any medications the AM of surgery. 3. Aspirin, Ibuprofen, Advil, Naproxen, Vitamin E and other Anti-inflammatory products and supplements should be stopped for 5 -7days before surgery or as directed by your physician. 4. Check with your Doctor regarding stopping Plavix, Coumadin,Eliquis, Lovenox,Effient,Pradaxa,Xarelto, Fragmin or other blood thinners and follow their instructions. 5. Do not smoke, and do not drink any alcoholic beverages 24 hours prior to surgery. This includes NA Beer. Refrain from the usage of any recreational drugs. 6. You may brush your teeth and gargle the morning of surgery. DO NOT SWALLOW WATER   7. You MUST make arrangements for a responsible adult to stay on site while you are here and take you home after your surgery.  You will not be allowed to leave alone or drive yourself home. It is strongly suggested someone stay with you the first 24 hrs. Your surgery will be cancelled if you do not have a ride home. 8. A parent/legal guardian must accompany a child scheduled for surgery and plan to stay at the hospital until the child is discharged. Please do not bring other children with you. 9. Please wear simple, loose fitting clothing to the hospital.  [place] not bring valuables (money, credit cards, checkbooks, etc.) Do not wear any makeup (including no eye makeup) or nail polish on your fingers or toes. 10. DO NOT wear any jewelry or piercings on day of surgery. All body piercing jewelry must be removed. 11. If you have ___dentures, they will be removed before going to the OR; we will provide you a container. If you wear ___contact lenses or ___glasses, they will be removed; please bring a case for them. 12. Please see your family doctor/pediatrician for a history & physical and/or concerning medications. Bring any test results/reports from your physician's office. PCP__________________Phone___________H&P Appt. Date________             13 If you  have a Living Will and Durable Power of  for Healthcare, please bring in a copy. 15. Notify your Surgeon if you develop any illness between now and surgery  time, cough, cold, fever, sore throat, nausea, vomiting, etc.  Please notify your surgeon if you experience dizziness, shortness of breath or blurred vision between now & the time of your surgery             15. DO NOT shave your operative site 96 hours prior to surgery. For face & neck surgery, men may use an electric razor 48 hours prior to surgery. 16. Shower the night before or morning of surgery using an antibacterial soap or as you have been instructed. 17. To provide excellent care visitors will be limited to one in the room at any given time.              18.  Please bring picture ID and insurance card. 19.  Visit our web site for additional information:  [email or web address]              20.During flu season no children under the age of 15 are permitted in the hospital for the safety of all patients. 21. If you take a long acting insulin in the evening only  take half of your usual  dose the night  before your procedure              22. If you use a c-pap please bring DOS if staying overnight,             23.For your convenience [facility] has a pharmacy on site to fill your prescriptions. 24. If you use oxygen and have a portable tank please bring it  with you the DOS             25. Bring a complete list of all your medications with name and dose include any supplements. 26. Other__________________________________________   *Please call pre admission testing if you any further questions   [address] [place] [address]. Air  [phone]   [address] [place] [address]       VISITOR POLICY(subject to change)    Current policy is 2 visitors per patient. No children. Mask is  at the discretion of the facility. Visiting hours are 8a-8p. Overnight visitors will be at the discretion of the nurse. All policies subject to change. All above information reviewed with patient in person or by phone. Patient verbalizes understanding. All questions and concerns addressed.                                                                                                  Patient/Rep__patient__________________                                                                                                                                    PRE OP INSTRUCTIONS

## 2022-11-29 ENCOUNTER — HOSPITAL ENCOUNTER (OUTPATIENT)
Age: 62
Setting detail: OUTPATIENT SURGERY
Discharge: HOME | End: 2022-11-29
Attending: NEUROLOGICAL SURGERY | Admitting: NEUROLOGICAL SURGERY
Payer: COMMERCIAL

## 2022-11-29 ENCOUNTER — ANESTHESIA (OUTPATIENT)
Dept: OPERATING ROOM | Age: 62
End: 2022-11-29
Payer: COMMERCIAL

## 2022-11-29 ENCOUNTER — APPOINTMENT (OUTPATIENT)
Dept: GENERAL RADIOLOGY | Age: 62
End: 2022-11-29
Attending: NEUROLOGICAL SURGERY
Payer: COMMERCIAL

## 2022-11-29 ENCOUNTER — ANESTHESIA EVENT (OUTPATIENT)
Dept: OPERATING ROOM | Age: 62
End: 2022-11-29
Payer: COMMERCIAL

## 2022-11-29 VITALS
HEIGHT: 76 IN | HEART RATE: 76 BPM | BODY MASS INDEX: 28.37 KG/M2 | RESPIRATION RATE: 20 BRPM | SYSTOLIC BLOOD PRESSURE: 129 MMHG | OXYGEN SATURATION: 95 % | DIASTOLIC BLOOD PRESSURE: 92 MMHG | TEMPERATURE: 97.7 F | WEIGHT: 233 LBS

## 2022-11-29 DIAGNOSIS — M54.12 RADICULOPATHY, CERVICAL REGION: Primary | ICD-10-CM

## 2022-11-29 PROCEDURE — 7100000000 HC PACU RECOVERY - FIRST 15 MIN: Performed by: NEUROLOGICAL SURGERY

## 2022-11-29 PROCEDURE — 6360000002 HC RX W HCPCS: Performed by: ANESTHESIOLOGY

## 2022-11-29 PROCEDURE — 2709999900 HC NON-CHARGEABLE SUPPLY: Performed by: NEUROLOGICAL SURGERY

## 2022-11-29 PROCEDURE — 3600000004 HC SURGERY LEVEL 4 BASE: Performed by: NEUROLOGICAL SURGERY

## 2022-11-29 PROCEDURE — 2500000003 HC RX 250 WO HCPCS: Performed by: NURSE ANESTHETIST, CERTIFIED REGISTERED

## 2022-11-29 PROCEDURE — 2580000003 HC RX 258: Performed by: NEUROLOGICAL SURGERY

## 2022-11-29 PROCEDURE — 6370000000 HC RX 637 (ALT 250 FOR IP): Performed by: ANESTHESIOLOGY

## 2022-11-29 PROCEDURE — 2500000003 HC RX 250 WO HCPCS: Performed by: NEUROLOGICAL SURGERY

## 2022-11-29 PROCEDURE — 7100000001 HC PACU RECOVERY - ADDTL 15 MIN: Performed by: NEUROLOGICAL SURGERY

## 2022-11-29 PROCEDURE — 3600000014 HC SURGERY LEVEL 4 ADDTL 15MIN: Performed by: NEUROLOGICAL SURGERY

## 2022-11-29 PROCEDURE — 72040 X-RAY EXAM NECK SPINE 2-3 VW: CPT

## 2022-11-29 PROCEDURE — 6360000002 HC RX W HCPCS: Performed by: NEUROLOGICAL SURGERY

## 2022-11-29 PROCEDURE — 6360000002 HC RX W HCPCS: Performed by: NURSE ANESTHETIST, CERTIFIED REGISTERED

## 2022-11-29 PROCEDURE — C1713 ANCHOR/SCREW BN/BN,TIS/BN: HCPCS | Performed by: NEUROLOGICAL SURGERY

## 2022-11-29 PROCEDURE — 3700000001 HC ADD 15 MINUTES (ANESTHESIA): Performed by: NEUROLOGICAL SURGERY

## 2022-11-29 PROCEDURE — A4217 STERILE WATER/SALINE, 500 ML: HCPCS | Performed by: NEUROLOGICAL SURGERY

## 2022-11-29 PROCEDURE — 7100000010 HC PHASE II RECOVERY - FIRST 15 MIN: Performed by: NEUROLOGICAL SURGERY

## 2022-11-29 PROCEDURE — 7100000011 HC PHASE II RECOVERY - ADDTL 15 MIN: Performed by: NEUROLOGICAL SURGERY

## 2022-11-29 PROCEDURE — 2720000010 HC SURG SUPPLY STERILE: Performed by: NEUROLOGICAL SURGERY

## 2022-11-29 PROCEDURE — 3700000000 HC ANESTHESIA ATTENDED CARE: Performed by: NEUROLOGICAL SURGERY

## 2022-11-29 PROCEDURE — 2580000003 HC RX 258: Performed by: ANESTHESIOLOGY

## 2022-11-29 PROCEDURE — 2580000003 HC RX 258: Performed by: NURSE ANESTHETIST, CERTIFIED REGISTERED

## 2022-11-29 DEVICE — DBM T45001 1CC PASTE GRAFTON PLUS
Type: IMPLANTABLE DEVICE | Site: NECK | Status: FUNCTIONAL
Brand: GRAFTON®AND GRAFTON PLUS®DEMINERALIZED BONE MATRIX (DBM)

## 2022-11-29 DEVICE — PLATE 9790027 ANT CERV ASSY 27 5MM
Type: IMPLANTABLE DEVICE | Site: NECK | Status: FUNCTIONAL
Brand: VENTURE™ ANTERIOR CERVICAL PLATE SYSTEM

## 2022-11-29 DEVICE — GRAFT HUM TISS W11XH6XL14MM CORT LORDTC SPCR BLK: Type: IMPLANTABLE DEVICE | Site: NECK | Status: FUNCTIONAL

## 2022-11-29 RX ORDER — KETAMINE HCL IN NACL, ISO-OSM 100MG/10ML
SYRINGE (ML) INJECTION PRN
Status: DISCONTINUED | OUTPATIENT
Start: 2022-11-29 | End: 2022-11-29 | Stop reason: SDUPTHER

## 2022-11-29 RX ORDER — LIDOCAINE HYDROCHLORIDE 10 MG/ML
0.5 INJECTION, SOLUTION EPIDURAL; INFILTRATION; INTRACAUDAL; PERINEURAL ONCE
Status: DISCONTINUED | OUTPATIENT
Start: 2022-11-29 | End: 2022-11-29 | Stop reason: HOSPADM

## 2022-11-29 RX ORDER — SODIUM CHLORIDE, SODIUM LACTATE, POTASSIUM CHLORIDE, CALCIUM CHLORIDE 600; 310; 30; 20 MG/100ML; MG/100ML; MG/100ML; MG/100ML
INJECTION, SOLUTION INTRAVENOUS CONTINUOUS
Status: DISCONTINUED | OUTPATIENT
Start: 2022-11-29 | End: 2022-11-29 | Stop reason: HOSPADM

## 2022-11-29 RX ORDER — OXYCODONE HYDROCHLORIDE 5 MG/1
5 TABLET ORAL EVERY 6 HOURS PRN
Qty: 42 TABLET | Refills: 0 | Status: SHIPPED | OUTPATIENT
Start: 2022-11-29 | End: 2022-12-06

## 2022-11-29 RX ORDER — ONDANSETRON 2 MG/ML
INJECTION INTRAMUSCULAR; INTRAVENOUS PRN
Status: DISCONTINUED | OUTPATIENT
Start: 2022-11-29 | End: 2022-11-29 | Stop reason: SDUPTHER

## 2022-11-29 RX ORDER — OXYCODONE HYDROCHLORIDE 5 MG/1
5 TABLET ORAL
Status: COMPLETED | OUTPATIENT
Start: 2022-11-29 | End: 2022-11-29

## 2022-11-29 RX ORDER — HYDRALAZINE HYDROCHLORIDE 20 MG/ML
10 INJECTION INTRAMUSCULAR; INTRAVENOUS
Status: DISCONTINUED | OUTPATIENT
Start: 2022-11-29 | End: 2022-11-29 | Stop reason: HOSPADM

## 2022-11-29 RX ORDER — MAGNESIUM HYDROXIDE 1200 MG/15ML
LIQUID ORAL CONTINUOUS PRN
Status: COMPLETED | OUTPATIENT
Start: 2022-11-29 | End: 2022-11-29

## 2022-11-29 RX ORDER — DEXAMETHASONE SODIUM PHOSPHATE 4 MG/ML
INJECTION, SOLUTION INTRA-ARTICULAR; INTRALESIONAL; INTRAMUSCULAR; INTRAVENOUS; SOFT TISSUE PRN
Status: DISCONTINUED | OUTPATIENT
Start: 2022-11-29 | End: 2022-11-29 | Stop reason: SDUPTHER

## 2022-11-29 RX ORDER — BUPIVACAINE HYDROCHLORIDE 5 MG/ML
INJECTION, SOLUTION EPIDURAL; INTRACAUDAL
Status: COMPLETED | OUTPATIENT
Start: 2022-11-29 | End: 2022-11-29

## 2022-11-29 RX ORDER — LIDOCAINE HYDROCHLORIDE 20 MG/ML
INJECTION, SOLUTION EPIDURAL; INFILTRATION; INTRACAUDAL; PERINEURAL PRN
Status: DISCONTINUED | OUTPATIENT
Start: 2022-11-29 | End: 2022-11-29 | Stop reason: SDUPTHER

## 2022-11-29 RX ORDER — PHENYLEPHRINE HCL IN 0.9% NACL 1 MG/10 ML
SYRINGE (ML) INTRAVENOUS PRN
Status: DISCONTINUED | OUTPATIENT
Start: 2022-11-29 | End: 2022-11-29 | Stop reason: SDUPTHER

## 2022-11-29 RX ORDER — VECURONIUM BROMIDE 1 MG/ML
INJECTION, POWDER, LYOPHILIZED, FOR SOLUTION INTRAVENOUS PRN
Status: DISCONTINUED | OUTPATIENT
Start: 2022-11-29 | End: 2022-11-29 | Stop reason: SDUPTHER

## 2022-11-29 RX ORDER — ONDANSETRON 2 MG/ML
4 INJECTION INTRAMUSCULAR; INTRAVENOUS
Status: DISCONTINUED | OUTPATIENT
Start: 2022-11-29 | End: 2022-11-29 | Stop reason: HOSPADM

## 2022-11-29 RX ORDER — MAGNESIUM SULFATE HEPTAHYDRATE 500 MG/ML
INJECTION, SOLUTION INTRAMUSCULAR; INTRAVENOUS PRN
Status: DISCONTINUED | OUTPATIENT
Start: 2022-11-29 | End: 2022-11-29 | Stop reason: SDUPTHER

## 2022-11-29 RX ORDER — PROPOFOL 10 MG/ML
INJECTION, EMULSION INTRAVENOUS PRN
Status: DISCONTINUED | OUTPATIENT
Start: 2022-11-29 | End: 2022-11-29 | Stop reason: SDUPTHER

## 2022-11-29 RX ORDER — LIDOCAINE HYDROCHLORIDE 10 MG/ML
1 INJECTION, SOLUTION EPIDURAL; INFILTRATION; INTRACAUDAL; PERINEURAL
Status: DISCONTINUED | OUTPATIENT
Start: 2022-11-29 | End: 2022-11-29 | Stop reason: HOSPADM

## 2022-11-29 RX ORDER — SODIUM CHLORIDE 9 MG/ML
INJECTION, SOLUTION INTRAVENOUS PRN
Status: DISCONTINUED | OUTPATIENT
Start: 2022-11-29 | End: 2022-11-29 | Stop reason: HOSPADM

## 2022-11-29 RX ORDER — METHOCARBAMOL 100 MG/ML
1000 INJECTION, SOLUTION INTRAMUSCULAR; INTRAVENOUS ONCE
Status: COMPLETED | OUTPATIENT
Start: 2022-11-29 | End: 2022-11-29

## 2022-11-29 RX ORDER — EPHEDRINE SULFATE/0.9% NACL/PF 50 MG/5 ML
SYRINGE (ML) INTRAVENOUS PRN
Status: DISCONTINUED | OUTPATIENT
Start: 2022-11-29 | End: 2022-11-29 | Stop reason: SDUPTHER

## 2022-11-29 RX ORDER — SODIUM CHLORIDE 0.9 % (FLUSH) 0.9 %
5-40 SYRINGE (ML) INJECTION PRN
Status: DISCONTINUED | OUTPATIENT
Start: 2022-11-29 | End: 2022-11-29 | Stop reason: HOSPADM

## 2022-11-29 RX ORDER — LABETALOL HYDROCHLORIDE 5 MG/ML
10 INJECTION, SOLUTION INTRAVENOUS
Status: DISCONTINUED | OUTPATIENT
Start: 2022-11-29 | End: 2022-11-29 | Stop reason: HOSPADM

## 2022-11-29 RX ORDER — MEPERIDINE HYDROCHLORIDE 25 MG/ML
12.5 INJECTION INTRAMUSCULAR; INTRAVENOUS; SUBCUTANEOUS EVERY 5 MIN PRN
Status: DISCONTINUED | OUTPATIENT
Start: 2022-11-29 | End: 2022-11-29 | Stop reason: HOSPADM

## 2022-11-29 RX ORDER — HYDROMORPHONE HCL 110MG/55ML
0.5 PATIENT CONTROLLED ANALGESIA SYRINGE INTRAVENOUS EVERY 5 MIN PRN
Status: COMPLETED | OUTPATIENT
Start: 2022-11-29 | End: 2022-11-29

## 2022-11-29 RX ORDER — FENTANYL CITRATE 50 UG/ML
INJECTION, SOLUTION INTRAMUSCULAR; INTRAVENOUS PRN
Status: DISCONTINUED | OUTPATIENT
Start: 2022-11-29 | End: 2022-11-29 | Stop reason: SDUPTHER

## 2022-11-29 RX ORDER — SUCCINYLCHOLINE/SOD CL,ISO/PF 200MG/10ML
SYRINGE (ML) INTRAVENOUS PRN
Status: DISCONTINUED | OUTPATIENT
Start: 2022-11-29 | End: 2022-11-29 | Stop reason: SDUPTHER

## 2022-11-29 RX ORDER — GLYCOPYRROLATE 0.2 MG/ML
INJECTION INTRAMUSCULAR; INTRAVENOUS PRN
Status: DISCONTINUED | OUTPATIENT
Start: 2022-11-29 | End: 2022-11-29 | Stop reason: SDUPTHER

## 2022-11-29 RX ORDER — SODIUM CHLORIDE, SODIUM LACTATE, POTASSIUM CHLORIDE, CALCIUM CHLORIDE 600; 310; 30; 20 MG/100ML; MG/100ML; MG/100ML; MG/100ML
INJECTION, SOLUTION INTRAVENOUS CONTINUOUS PRN
Status: DISCONTINUED | OUTPATIENT
Start: 2022-11-29 | End: 2022-11-29 | Stop reason: SDUPTHER

## 2022-11-29 RX ORDER — HYDROMORPHONE HCL 110MG/55ML
PATIENT CONTROLLED ANALGESIA SYRINGE INTRAVENOUS PRN
Status: DISCONTINUED | OUTPATIENT
Start: 2022-11-29 | End: 2022-11-29 | Stop reason: SDUPTHER

## 2022-11-29 RX ORDER — SODIUM CHLORIDE 0.9 % (FLUSH) 0.9 %
5-40 SYRINGE (ML) INJECTION EVERY 12 HOURS SCHEDULED
Status: DISCONTINUED | OUTPATIENT
Start: 2022-11-29 | End: 2022-11-29 | Stop reason: HOSPADM

## 2022-11-29 RX ADMIN — DEXAMETHASONE SODIUM PHOSPHATE 4 MG: 4 INJECTION, SOLUTION INTRAMUSCULAR; INTRAVENOUS at 14:20

## 2022-11-29 RX ADMIN — VECURONIUM BROMIDE 10 MG: 1 INJECTION, POWDER, LYOPHILIZED, FOR SOLUTION INTRAVENOUS at 14:20

## 2022-11-29 RX ADMIN — HYDROMORPHONE HYDROCHLORIDE 0.5 MG: 2 INJECTION, SOLUTION INTRAMUSCULAR; INTRAVENOUS; SUBCUTANEOUS at 17:11

## 2022-11-29 RX ADMIN — HYDROMORPHONE HYDROCHLORIDE 0.5 MG: 2 INJECTION, SOLUTION INTRAMUSCULAR; INTRAVENOUS; SUBCUTANEOUS at 16:29

## 2022-11-29 RX ADMIN — FENTANYL CITRATE 50 MCG: 50 INJECTION, SOLUTION INTRAMUSCULAR; INTRAVENOUS at 14:12

## 2022-11-29 RX ADMIN — HYDROMORPHONE HYDROCHLORIDE 0.5 MG: 2 INJECTION, SOLUTION INTRAMUSCULAR; INTRAVENOUS; SUBCUTANEOUS at 16:12

## 2022-11-29 RX ADMIN — Medication 5 MG: at 16:03

## 2022-11-29 RX ADMIN — Medication 5 MG: at 15:57

## 2022-11-29 RX ADMIN — Medication 25 MCG: at 15:56

## 2022-11-29 RX ADMIN — OXYCODONE 5 MG: 5 TABLET ORAL at 18:37

## 2022-11-29 RX ADMIN — Medication 5 MG: at 15:06

## 2022-11-29 RX ADMIN — Medication 50 MCG: at 14:42

## 2022-11-29 RX ADMIN — Medication 140 MG: at 14:12

## 2022-11-29 RX ADMIN — Medication 50 MCG: at 14:56

## 2022-11-29 RX ADMIN — SODIUM CHLORIDE, POTASSIUM CHLORIDE, SODIUM LACTATE AND CALCIUM CHLORIDE: 600; 310; 30; 20 INJECTION, SOLUTION INTRAVENOUS at 14:06

## 2022-11-29 RX ADMIN — Medication 50 MCG: at 15:04

## 2022-11-29 RX ADMIN — HYDROMORPHONE HYDROCHLORIDE 0.5 MG: 2 INJECTION, SOLUTION INTRAMUSCULAR; INTRAVENOUS; SUBCUTANEOUS at 16:18

## 2022-11-29 RX ADMIN — CEFAZOLIN 2000 MG: 2 INJECTION, POWDER, FOR SOLUTION INTRAMUSCULAR; INTRAVENOUS at 14:30

## 2022-11-29 RX ADMIN — Medication 25 MCG: at 15:51

## 2022-11-29 RX ADMIN — HYDROMORPHONE HYDROCHLORIDE 0.5 MG: 2 INJECTION, SOLUTION INTRAMUSCULAR; INTRAVENOUS; SUBCUTANEOUS at 16:27

## 2022-11-29 RX ADMIN — Medication 5 MG: at 15:52

## 2022-11-29 RX ADMIN — HYDROMORPHONE HYDROCHLORIDE 0.5 MG: 2 INJECTION, SOLUTION INTRAMUSCULAR; INTRAVENOUS; SUBCUTANEOUS at 16:42

## 2022-11-29 RX ADMIN — METHOCARBAMOL 1000 MG: 100 INJECTION INTRAMUSCULAR; INTRAVENOUS at 17:49

## 2022-11-29 RX ADMIN — Medication 25 MG: at 14:39

## 2022-11-29 RX ADMIN — Medication 5 MG: at 15:35

## 2022-11-29 RX ADMIN — Medication 5 MG: at 15:10

## 2022-11-29 RX ADMIN — Medication 5 MG: at 15:14

## 2022-11-29 RX ADMIN — Medication 25 MCG: at 16:01

## 2022-11-29 RX ADMIN — GLYCOPYRROLATE 0.2 MG: 0.2 INJECTION INTRAMUSCULAR; INTRAVENOUS at 14:28

## 2022-11-29 RX ADMIN — HYDROMORPHONE HYDROCHLORIDE 0.5 MG: 2 INJECTION, SOLUTION INTRAMUSCULAR; INTRAVENOUS; SUBCUTANEOUS at 17:29

## 2022-11-29 RX ADMIN — Medication 5 MG: at 15:18

## 2022-11-29 RX ADMIN — MAGNESIUM SULFATE HEPTAHYDRATE 1 G: 500 INJECTION, SOLUTION INTRAMUSCULAR; INTRAVENOUS at 14:20

## 2022-11-29 RX ADMIN — Medication 5 MG: at 16:08

## 2022-11-29 RX ADMIN — LIDOCAINE HYDROCHLORIDE 100 MG: 20 INJECTION, SOLUTION EPIDURAL; INFILTRATION; INTRACAUDAL; PERINEURAL at 14:12

## 2022-11-29 RX ADMIN — SUGAMMADEX 250 MG: 100 INJECTION, SOLUTION INTRAVENOUS at 16:17

## 2022-11-29 RX ADMIN — FENTANYL CITRATE 50 MCG: 50 INJECTION, SOLUTION INTRAMUSCULAR; INTRAVENOUS at 14:34

## 2022-11-29 RX ADMIN — Medication 50 MCG: at 15:20

## 2022-11-29 RX ADMIN — SODIUM CHLORIDE, POTASSIUM CHLORIDE, SODIUM LACTATE AND CALCIUM CHLORIDE: 600; 310; 30; 20 INJECTION, SOLUTION INTRAVENOUS at 12:11

## 2022-11-29 RX ADMIN — PROPOFOL 200 MG: 10 INJECTION, EMULSION INTRAVENOUS at 14:12

## 2022-11-29 RX ADMIN — HYDROMORPHONE HYDROCHLORIDE 0.5 MG: 2 INJECTION, SOLUTION INTRAMUSCULAR; INTRAVENOUS; SUBCUTANEOUS at 16:51

## 2022-11-29 RX ADMIN — Medication 5 MG: at 15:40

## 2022-11-29 RX ADMIN — ONDANSETRON 4 MG: 2 INJECTION INTRAMUSCULAR; INTRAVENOUS at 14:20

## 2022-11-29 RX ADMIN — VECURONIUM BROMIDE 5 MG: 1 INJECTION, POWDER, LYOPHILIZED, FOR SOLUTION INTRAVENOUS at 15:57

## 2022-11-29 RX ADMIN — Medication 25 MG: at 15:27

## 2022-11-29 ASSESSMENT — PAIN SCALES - GENERAL
PAINLEVEL_OUTOF10: 4
PAINLEVEL_OUTOF10: 6
PAINLEVEL_OUTOF10: 8
PAINLEVEL_OUTOF10: 6
PAINLEVEL_OUTOF10: 5
PAINLEVEL_OUTOF10: 7
PAINLEVEL_OUTOF10: 3

## 2022-11-29 ASSESSMENT — PAIN DESCRIPTION - DESCRIPTORS
DESCRIPTORS: DULL
DESCRIPTORS: DULL
DESCRIPTORS: DULL;NUMBNESS;TINGLING
DESCRIPTORS: DULL

## 2022-11-29 ASSESSMENT — PAIN DESCRIPTION - LOCATION
LOCATION: NECK

## 2022-11-29 ASSESSMENT — PAIN - FUNCTIONAL ASSESSMENT: PAIN_FUNCTIONAL_ASSESSMENT: 0-10

## 2022-11-29 ASSESSMENT — PAIN DESCRIPTION - ORIENTATION
ORIENTATION: POSTERIOR
ORIENTATION: POSTERIOR

## 2022-11-29 ASSESSMENT — LIFESTYLE VARIABLES: SMOKING_STATUS: 0

## 2022-11-29 NOTE — ANESTHESIA PRE PROCEDURE
Department of Anesthesiology  Preprocedure Note       Name:  Jan Anne   Age:  63 y.o.  :  1960                                          MRN:  [id number]         Date:  2022      Surgeon: Tu Steven):  Tahira Malik MD    Procedure: Procedure(s):  CERVICAL6-CERVICAL7 ANTERIOR CERVICAL DISCECTOMY AND FUSION    Medications prior to admission:   Prior to Admission medications    Medication Sig Start Date End Date Taking?  Authorizing Provider   lansoprazole (PREVACID) 15 MG delayed release capsule Take 1 capsule by mouth 12   Historical Provider, MD       Current medications:    Current Facility-Administered Medications   Medication Dose Route Frequency Provider Last Rate Last Admin    lactated ringers infusion   IntraVENous Continuous Tahira Malik MD        lidocaine PF 1 % injection 0.5 mL  0.5 mL IntraDERmal Once Tahira Malik MD        ceFAZolin (ANCEF) 2,000 mg in dextrose 5 % 50 mL IVPB (mini-bag)  2,000 mg IntraVENous On Call to [facility], [place]           Allergies:  No Known Allergies    Problem List:    Patient Active Problem List   Diagnosis Code    Gastroesophageal reflux disease without esophagitis K21.9    KENNY (obstructive sleep apnea) G47.33    Hypertriglyceridemia E78.1    Numbness and tingling in left hand R20.0, R20.2    Impaired fasting glucose R73.01    Spondylosis without myelopathy or radiculopathy, cervicothoracic region M47.813    Spondylosis without myelopathy or radiculopathy, cervical region M47.812    Radiculopathy, cervical region M54.12       Past Medical History:        Diagnosis Date    GERD (gastroesophageal reflux disease)     Hypercholesterolemia     Sleep apnea     Testicular hypofunction        Past Surgical History:        Procedure Laterality Date    ACHILLES TENDON SURGERY Right     CERVICAL [address] SURGERY  1984    COLONOSCOPY N/A 2021    COLONOSCOPY POLYPECTOMY SNARE/COLD BIOPSY performed by Bibiana Barker MD at [address] [facility] ENDOSCOPY       Social History:    Social History     Tobacco Use    Smoking status: Never    Smokeless tobacco: Never   Substance Use Topics    Alcohol use: Not Currently     Comment: I have an occasional drink (quite rarely). Counseling given: Not Answered      Vital Signs (Current):   Vitals:    10/10/22 1547 11/28/22 0903   Weight: 235 lb (106.6 kg) 233 lb (105.7 kg)   Height: 6' 4\" (1.93 m) 6' 4\" (1.93 m)                                              BP Readings from Last 3 Encounters:   11/03/22 (!) 140/90   09/26/22 134/86   04/22/21 114/75       NPO Status:                                                                                 BMI:   Wt Readings from Last 3 Encounters:   11/28/22 233 lb (105.7 kg)   11/03/22 234 lb (106.1 kg)   09/26/22 235 lb 9.6 oz (106.9 kg)     Body mass index is 28.36 kg/m². CBC:   Lab Results   Component Value Date/Time    WBC 8.8 11/03/2022 09:54 AM    RBC 5.00 11/03/2022 09:54 AM    HGB 15.0 11/03/2022 09:54 AM    HCT 43.6 11/03/2022 09:54 AM    MCV 87.3 11/03/2022 09:54 AM    RDW 13.5 11/03/2022 09:54 AM     11/03/2022 09:54 AM       CMP:   Lab Results   Component Value Date/Time     11/03/2022 09:54 AM    K 4.4 11/03/2022 09:54 AM     11/03/2022 09:54 AM    CO2 29 11/03/2022 09:54 AM    BUN 15 11/03/2022 09:54 AM    CREATININE 0.9 11/03/2022 09:54 AM    GFRAA >60 09/29/2022 07:49 AM    AGRATIO 2.2 12/17/2020 09:17 AM    LABGLOM >60 11/03/2022 09:54 AM    GLUCOSE 105 11/03/2022 09:54 AM    PROT 7.0 12/17/2020 09:17 AM    CALCIUM 9.1 11/03/2022 09:54 AM    BILITOT 1.2 12/17/2020 09:17 AM    ALKPHOS 67 12/17/2020 09:17 AM    AST 23 12/17/2020 09:17 AM    ALT 26 12/17/2020 09:17 AM       POC Tests: No results for input(s): POCGLU, POCNA, POCK, POCCL, POCBUN, POCHEMO, POCHCT in the last 72 hours.     Coags:   Lab Results   Component Value Date/Time    PROTIME 13.7 11/03/2022 09:59 AM    INR 1.06 11/03/2022 09:59 AM    APTT 35.3 11/03/2022 09:59 AM       HCG (If Applicable): No results found for: PREGTESTUR, PREGSERUM, HCG, HCGQUANT     ABGs: No results found for: PHART, PO2ART, MGF5JUL, VBX2VLJ, BEART, F4ZXWKRG     Type & Screen (If Applicable):  No results found for: LABABO, LABRH    Drug/Infectious Status (If Applicable):  No results found for: HIV, HEPCAB    COVID-19 Screening (If Applicable):   Lab Results   Component Value Date/Time    COVID19 Not Detected 04/16/2021 09:56 AM           Anesthesia Evaluation  Patient summary reviewed and Nursing notes reviewed no history of anesthetic complications:   Airway: Mallampati: II  TM distance: >3 FB   Neck ROM: limited  Mouth opening: > = 3 FB   Dental: normal exam         Pulmonary:normal exam  breath sounds clear to auscultation  (+) sleep apnea: on CPAP,      (-) COPD, asthma and not a current smoker                           Cardiovascular:    (+) hyperlipidemia    (-) hypertension, past MI, CAD, CABG/stent, dysrhythmias,  angina and  CHF    ECG reviewed  Rhythm: regular  Rate: normal                    Neuro/Psych:   (+) neuromuscular disease (cervical ddd, limited ROM w/ RUE radiculopathy):,    (-) seizures, TIA and CVA           GI/Hepatic/Renal:   (+) GERD: well controlled,      (-) liver disease and no renal disease       Endo/Other: Negative Endo/Other ROS       (-) diabetes mellitus, hypothyroidism, hyperthyroidism               Abdominal:             Vascular: Other Findings:           Anesthesia Plan      general     ASA 2       Induction: intravenous. MIPS: Postoperative opioids intended and Prophylactic antiemetics administered. Anesthetic plan and risks discussed with patient. Plan discussed with CRNA.                     Baron Morocho MD   11/29/2022

## 2022-11-29 NOTE — OP NOTE
[facility] OR  11/29/2022 4:15 PM    PATIENT NAME:         Santi Decker OF BIRTH:         1960   MEDICAL RECORD#         [id number]  SURGERY DATE:         11/29/2022  SURGEON:                 Alvarado Cali MD                                                      OPERATIVE REPORT     PREOPERATIVE DIAGNOSIS: Cervical radiculopathy [M54.12]            POSTOPERATIVE DIAGNOSIS:    same    PROCEDURE:    1. Decompressive anterior cervical discectomy at C6-7.  2. Microdissection using the operating room microscope. 3.Anterior cervical arthrodesis at C6-7.  4. Placement of Venture plate at M9-6.   5. Placement of corticocancellous structural allograft packed with locally harvested autograft bone fillings from the interspace drilling    COMPLICATIONS:  None     ANESTHETIC:          General endotracheal.     ESTIMATED BLOOD LOSS:  25 cc. INDICATIONS:   Andrea Citizen is a 63 y.o. male  with neck pain and radiating extremity pain. The symptoms failed to respond to conservative intervention. An MRI scan confirmed a herniated cervical disc at C6-7. Having failed conservative management and experiencing persistent symptoms, the patient elected to proceed ahead with anterior cervical discectomy with stabilization and fusion at C6-7. Full informed consent of the operative risks, complications, and expected outcomes were discussed with the patient who fully acknowledged the understanding of these complications and consented to the operation. DESCRIPTION OF PROCEDURE:  The patient was brought to the operating room and placed under general anesthesia. The patient was then placed supine on the operating table. The anterior cervical area was prepped and draped in the usual sterile fashion. Using a #15 blade knife, the skin was incised in a horizontal fashion over the C6-7 interspace. Monopolar cautery was used to open the platysma muscle.   Blunt dissection was then used to develop the avascular plane between the sternocleidomastoid and the medial strap muscles exposing the prevertebral space at C6-7. Lateral fluoroscopic imaging was used to confirm proper localization at this level. A self-retaining retractor was placed. The microscope was then brought in and used to assist with performing the microsurgical decompression. Using the high-speed [employer] Jose Alfredo drill, I removed the anterior osteophytes. I then used pituitary forceps to remove the soft disk material as well as the cartilaginous end plate material.  I then performed a foraminotomy at this level bilaterally. The posterior longitudinal ligament was explored and a Fortem hook was used to retrieve disk fragments. The posterior longitudinal ligament itself was opened with a 2 mm punch and removed in a piecemeal fashion. Both the central canal and the lateral nerve roots were decompressed bilaterally. The wound was copiously irrigated with antibiotic solution. The endplates were then fashioned using a series of rasps to accept a lordotically shaped spacer. A corticocancellous structural allograft cage was selected and further packed with locally harvested autograft bone filings from the interspace drilling was carefully impacted into the C6-7 interspace. A [employer] Venture cervical spine plate was then placed across the interspace for stabilization. The wound was then closed in the usual fashion using running 3-0 Vicryl in the platysma and a running 4-0 Monocryl in the subcuticular layer. A sterile dressing was then applied. The patient was extubated in the operating room and transferred to the recovery room in sable condition. There were no complications. In accordance with CMS guidelines, I attest that I was present for the entire procedure from the creation of the skin incision to the closure.       Erick Martinez M.D.

## 2022-11-29 NOTE — H&P
Date of Surgery Update:  Constantine Griffin was seen, history and physical examination reviewed, and patient examined by me today. There have been no significant clinical changes since the completion of the previous history and physical.    The risk, benefits, and alternatives of the proposed procedure have been explained to the patient (or appropriate guardian) and understanding verbalized. All questions answered. Patient wishes to proceed.     Electronically signed by: Lucho Stratton MD,11/29/2022,1:27 PM

## 2022-11-29 NOTE — DISCHARGE INSTRUCTIONS
Your instructions:   [facility]  Postoperative Care Instructions  Activity and Restrictions  Do not lift anything greater than 5-10 pounds until after your post-op appointment. Do not twist, avoid bending, gentle bending is allowed. No driving until after your post-op appointment. You may ride in a car for short periods of 45 minutes or less for essential purposes only. No strenuous activity until after your post-op appointment. This includes housework, yard work and sexual activity. Gradually return to your normal activities. Fatigue is common and expected. Let pain be your guide. Walking is encouraged every hour that you are awake; gradually increase walking to 1-2 miles per day. It is OK to go up and down steps and to walk outside. Do NOT smoke. Smoking delays healing and increases the risk of infection. Do not drink alcohol for 24 hours after surgery or while taking narcotic pain medicine. Discomfort    Right after surgery, pain is managed with narcotic medication. Because narcotic pills are addictive, they are used for a limited period (2-4 weeks). Thereafter, pain is managed with Tylenol. Please be aware that there is Tylenol in your narcotic pain pills and additional Tylenol should not be taken with the narcotic pain pills. Narcotic pain pills may cause constipation so drink lots of water and eat high fiber foods. Laxatives (Ducolax, Senokot, Colace, Milk of Magnesia) are available without a prescription. Do not use aspirin or NSAIDS (some examples are; Advil, Motrin, naproxen sodium, Aleve) without your surgeons approval.  Ice intermittently to incision area is recommended for the first two days. Incision Care    Do not submerge your incision under water. No baths, hot tubs or swimming until after surgeons approval.  Incisions normally have a small amount of clear or pink drainage, if necessary, cover with gauze and tape dressing. A temperature of 101 ? (unrelieved by Tylenol), or a large amount of drainage, foul smelling drainage, or drainage that is yellowish or green in color should be immediately reported to your surgeon. Dermabond TOPICAL SKIN ADHESIVE CARE    11/29/2022    Dermabond is a clear, sterile liquid skin adhesive that holds wound/incision edges together. The adhesive will usually remain in place for 5 to 10 days, then naturally wear or slough off your skin. Do not scratch, rub, or pick at the Skin Affix adhesive film. Do not apply liquid or ointment medications, soap, powders or lotions to the incision while the Dermabond is in place. You may shower 24 hours after your surgery and briefly wet the Dermabond. Do not sit in a tub of water or swim. Do not soak or scrub your incision. After showering, gently blot your incision dry. No tape or any type of bandage/covering is required over the Dermabond. If you have questions or problems, you can call the office at [phone]. Resume home diet      S[place] DISCHARGE INSTRUCTIONS   11/29/2022    Wear your seatbelt home. You are under the influence of drugs. Do not drink alcohol, drive, operate machinery, or make any important decisions or sign any legal documents for 24 hours  A responsible adult needs to be with you for 24 hours. You may experience lightheadedness, dizziness, nausea, heightened emotions and/or sleepiness following surgery. Rest at home today- increase activity as tolerated. Progress slowly to a regular diet and drink plenty of fluids unless your physician has instructed you otherwise. If nausea becomes a problem, call your physician. Coughing, sore throat, and muscle aches are other side effects of anesthesia and should improve with time. Do not drive or operate machinery while taking narcotics.

## 2022-11-29 NOTE — PROGRESS NOTES
Pt to PACU from OR, arouses to voice. VSS- on 6L simple mask satting high 90s. NSR on monitor. Pt oriented x4. X1 surgical incision to anterior neck CDI, ice applied. Neuro checks WNL at this time. Radial and pedal pulses palpable bilaterally.  Will continue to monitor

## 2022-11-29 NOTE — BRIEF OP NOTE
Brief Postoperative Note      Patient: Derrell Mackey  YOB: 1960  MRN: [id number]    Date of Procedure: 11/29/2022    Pre-Op Diagnosis: Cervical radiculopathy [M54.12]    Post-Op Diagnosis: Same       Procedure(s):  CERVICAL6-CERVICAL7 ANTERIOR CERVICAL DISCECTOMY AND FUSION    Surgeon(s):  Edith Palmer MD    Assistant:  Surgical Assistant: Mariah Sainz    Anesthesia: General    Estimated Blood Loss (mL): less than 50     Complications: None    Specimens:   * No specimens in log *    Implants:  Implant Name Type Inv.  Item Serial No.  Lot No. LRB No. Used Action   GRAFT HUM TISS [id number] MICHELE [employer] SPCR BLK - [id number]  GRAFT HUM TISS [id number] MICHELE [employer] SPCR BLK [id number] [employer] SPINALGRAFT TECH-WD  N/A 1 Implanted   GRAFT BNE SUB 1CC DEMIN MTRX PSTE GRFTON + - [id number]  GRAFT BNE SUB 1CC DEMIN MTRX PSTE GRFTON + [id number] [employer] SPINAL[employer] TECH-WD  N/A 1 Implanted   PLATE SPNL F60.4SF ANT CERV TI ALLY 1 LEV SAMIR VENTURE - [id number]  PLATE SPNL M24.9ID ANT CERV TI ALLY 1 LEV SAMIR VENTURE  [employer] SOFAMOR DANEK-WD  N/A 1 Implanted   SCREW SPNL SD 4X15 MM LUC VENTURE - [id number]  SCREW SPNL SD 4X15 MM LUC VENTURE  [employer] SOFAMOR DANEK-WD  N/A 4 Implanted         Drains: * No LDAs found *    Findings: cervical spondylosis C67    Electronically signed by Edith Palmer MD on 11/29/2022 at 4:13 PM

## 2022-11-29 NOTE — ANESTHESIA POSTPROCEDURE EVALUATION
Department of Anesthesiology  Postprocedure Note    Patient: Isael De Oliveira  MRN: [id number]  YOB: 1960  Date of evaluation: 11/29/2022      Procedure Summary     Date: 11/29/22 Room / Location: [address]    Anesthesia Start: 1406 Anesthesia Stop: 9983    Procedure: CERVICAL6-CERVICAL7 ANTERIOR CERVICAL DISCECTOMY AND FUSION (Neck) Diagnosis:       Cervical radiculopathy      (Cervical radiculopathy [M54.12])    Surgeons: Kellee Ji MD Responsible Provider: Melissa Harris MD    Anesthesia Type: general ASA Status: 2          Anesthesia Type: No value filed.     Pearl Phase I: Pearl Score: 8    Pearl Phase II:        Anesthesia Post Evaluation    Patient location during evaluation: PACU  Patient participation: complete - patient participated  Level of consciousness: awake and alert  Airway patency: patent  Nausea & Vomiting: no nausea and no vomiting  Complications: no  Cardiovascular status: blood pressure returned to baseline  Respiratory status: acceptable  Hydration status: euvolemic  Multimodal analgesia pain management approach

## 2022-11-30 NOTE — PROGRESS NOTES
Discussed discharge paperwork and new medication prescription with pt and pts wife- both verbalized understanding. Pts wife assisted pt in safely getting dressed and IV was removed with no complications. Pt up to bathroom and able to void prior to discharge. Pt was then wheeled to front lobby to be taken home by wife. PO oxy given to pt prior to discharge per request- pt aware when next dose is due.  All questions answered at this time

## 2022-11-30 NOTE — PROGRESS NOTES
Pt awake resting in bed with VSS- on RA satting mid 90s. X1 surgical incision to anterior neck CDI with no drainage or signs of hematoma. Neuro checks WNL at this time. Pt states pain is tolerable and denies nausea- tolerating pudding and water.  Phase one criteria met, will transition to phase two for discharge

## 2023-11-28 SDOH — ECONOMIC STABILITY: FOOD INSECURITY: WITHIN THE PAST 12 MONTHS, YOU WORRIED THAT YOUR FOOD WOULD RUN OUT BEFORE YOU GOT MONEY TO BUY MORE.: NEVER TRUE

## 2023-11-28 SDOH — ECONOMIC STABILITY: FOOD INSECURITY: WITHIN THE PAST 12 MONTHS, THE FOOD YOU BOUGHT JUST DIDN'T LAST AND YOU DIDN'T HAVE MONEY TO GET MORE.: NEVER TRUE

## 2023-11-28 SDOH — ECONOMIC STABILITY: TRANSPORTATION INSECURITY
IN THE PAST 12 MONTHS, HAS LACK OF TRANSPORTATION KEPT YOU FROM MEETINGS, WORK, OR FROM GETTING THINGS NEEDED FOR DAILY LIVING?: NO

## 2023-11-28 SDOH — ECONOMIC STABILITY: INCOME INSECURITY: HOW HARD IS IT FOR YOU TO PAY FOR THE VERY BASICS LIKE FOOD, HOUSING, MEDICAL CARE, AND HEATING?: NOT HARD AT ALL

## 2023-11-28 SDOH — ECONOMIC STABILITY: HOUSING INSECURITY
IN THE LAST 12 MONTHS, WAS THERE A TIME WHEN YOU DID NOT HAVE A STEADY PLACE TO SLEEP OR SLEPT IN A SHELTER (INCLUDING NOW)?: NO

## 2023-11-28 ASSESSMENT — PATIENT HEALTH QUESTIONNAIRE - PHQ9
2. FEELING DOWN, DEPRESSED OR HOPELESS: NOT AT ALL
1. LITTLE INTEREST OR PLEASURE IN DOING THINGS: 0
SUM OF ALL RESPONSES TO PHQ9 QUESTIONS 1 & 2: 0
1. LITTLE INTEREST OR PLEASURE IN DOING THINGS: NOT AT ALL
SUM OF ALL RESPONSES TO PHQ9 QUESTIONS 1 & 2: 0
SUM OF ALL RESPONSES TO PHQ QUESTIONS 1-9: 0
2. FEELING DOWN, DEPRESSED OR HOPELESS: 0
SUM OF ALL RESPONSES TO PHQ QUESTIONS 1-9: 0

## 2023-12-01 ENCOUNTER — OFFICE VISIT (OUTPATIENT)
Dept: FAMILY MEDICINE CLINIC | Age: 63
End: 2023-12-01
Payer: COMMERCIAL

## 2023-12-01 VITALS
OXYGEN SATURATION: 93 % | BODY MASS INDEX: 30.21 KG/M2 | HEIGHT: 74 IN | TEMPERATURE: 97.9 F | SYSTOLIC BLOOD PRESSURE: 124 MMHG | WEIGHT: 235.4 LBS | HEART RATE: 57 BPM | DIASTOLIC BLOOD PRESSURE: 70 MMHG

## 2023-12-01 DIAGNOSIS — Z12.5 SCREENING PSA (PROSTATE SPECIFIC ANTIGEN): ICD-10-CM

## 2023-12-01 DIAGNOSIS — R73.01 IMPAIRED FASTING GLUCOSE: ICD-10-CM

## 2023-12-01 DIAGNOSIS — Z00.00 WELL ADULT EXAM: Primary | ICD-10-CM

## 2023-12-01 DIAGNOSIS — Z00.00 WELL ADULT EXAM: ICD-10-CM

## 2023-12-01 DIAGNOSIS — G47.33 OSA (OBSTRUCTIVE SLEEP APNEA): ICD-10-CM

## 2023-12-01 LAB
ALBUMIN SERPL-MCNC: 4.7 G/DL (ref 3.4–5)
ALBUMIN/GLOB SERPL: 1.9 {RATIO} (ref 1.1–2.2)
ALP SERPL-CCNC: 66 U/L (ref 40–129)
ALT SERPL-CCNC: 35 U/L (ref 10–40)
ANION GAP SERPL CALCULATED.3IONS-SCNC: 12 MMOL/L (ref 3–16)
AST SERPL-CCNC: 33 U/L (ref 15–37)
BILIRUB SERPL-MCNC: 1.6 MG/DL (ref 0–1)
BUN SERPL-MCNC: 17 MG/DL (ref 7–20)
CALCIUM SERPL-MCNC: 9.2 MG/DL (ref 8.3–10.6)
CHLORIDE SERPL-SCNC: 104 MMOL/L (ref 99–110)
CHOLEST SERPL-MCNC: 169 MG/DL (ref 0–199)
CO2 SERPL-SCNC: 23 MMOL/L (ref 21–32)
CREAT SERPL-MCNC: 1 MG/DL (ref 0.8–1.3)
GFR SERPLBLD CREATININE-BSD FMLA CKD-EPI: >60 ML/MIN/{1.73_M2}
GLUCOSE SERPL-MCNC: 108 MG/DL (ref 70–99)
HDLC SERPL-MCNC: 31 MG/DL (ref 40–60)
LDLC SERPL CALC-MCNC: 100 MG/DL
POTASSIUM SERPL-SCNC: 4.6 MMOL/L (ref 3.5–5.1)
PROT SERPL-MCNC: 7.2 G/DL (ref 6.4–8.2)
PSA SERPL DL<=0.01 NG/ML-MCNC: 1.7 NG/ML (ref 0–4)
SODIUM SERPL-SCNC: 139 MMOL/L (ref 136–145)
TRIGL SERPL-MCNC: 191 MG/DL (ref 0–150)
VLDLC SERPL CALC-MCNC: 38 MG/DL

## 2023-12-01 PROCEDURE — 99396 PREV VISIT EST AGE 40-64: CPT | Performed by: FAMILY MEDICINE

## 2023-12-01 ASSESSMENT — ENCOUNTER SYMPTOMS: RESPIRATORY NEGATIVE: 1

## 2023-12-01 NOTE — PROGRESS NOTES
Conjunctivae normal.   Neck:      Thyroid: No thyromegaly. Cardiovascular:      Rate and Rhythm: Normal rate and regular rhythm. Heart sounds: Normal heart sounds. No murmur heard. Pulmonary:      Effort: Pulmonary effort is normal.      Breath sounds: Normal breath sounds. No wheezing or rales. Abdominal:      General: Bowel sounds are normal. There is no distension. Palpations: Abdomen is soft. There is no hepatomegaly or splenomegaly. Tenderness: There is no abdominal tenderness. Musculoskeletal:      Cervical back: Normal range of motion and neck supple. Skin:     General: Skin is warm and dry. Neurological:      Mental Status: He is alert and oriented to person, place, and time. Cranial Nerves: No cranial nerve deficit. Deep Tendon Reflexes: Reflexes are normal and symmetric. Psychiatric:         Behavior: Behavior normal.         Thought Content: Thought content normal.         Judgment: Judgment normal.              An electronic signature was used to authenticate this note.     --Oleg Longoria MD

## 2023-12-02 LAB
EST. AVERAGE GLUCOSE BLD GHB EST-MCNC: 111.2 MG/DL
HBA1C MFR BLD: 5.5 %

## (undated) DEVICE — SET VLV 3 PC AWS DISPOSABLE GRDIAN SCOPEVALET

## (undated) DEVICE — SPONGE,PEANUT,XRAY,ST,SM,3/8",5/CARD: Brand: MEDLINE INDUSTRIES, INC.

## (undated) DEVICE — CARBIDE MATCH HEAD

## (undated) DEVICE — SURE SET SINGLE BASIN-LF: Brand: MEDLINE INDUSTRIES, INC.

## (undated) DEVICE — LOTION PREP REMV 5OZ IODO CLR TINC OF BENZ DURAPREP

## (undated) DEVICE — PROTECTOR EYE PT SELF ADH NS OPT GRD LF

## (undated) DEVICE — BLADE ES L6IN ELASTOMERIC COAT INSUL DURABLE BEND UPTO

## (undated) DEVICE — SUTURE MCRYL + SZ 4-0 L27IN ABSRB UD L19MM PS-2 3/8 CIR MCP426H

## (undated) DEVICE — CASPAR DISTRACTION PIN 14MM: Brand: AESCULAP

## (undated) DEVICE — TRAP SPEC RETRV CLR PLAS POLYP IN LN SUCT QUIK CTCH

## (undated) DEVICE — DRAPE MICSCP W132XL406CM LENS DIA68MM W VARI LENS2 FOR LEICA

## (undated) DEVICE — DRAPE ADOLESCENT  LAPAROTOMY

## (undated) DEVICE — DRAPE 60X42IN RADIOLOGY C ARM ADHES 1013

## (undated) DEVICE — PROCEDURE KIT ENDOSCP CUST

## (undated) DEVICE — ELECTRODE PT RET AD L9FT HI MOIST COND ADH HYDRGEL CORDED

## (undated) DEVICE — SUTURE VCRL + SZ 3-0 L18IN ABSRB UD SH 1/2 CIR TAPERCUT NDL VCP864D

## (undated) DEVICE — LIMB HOLDER, WRIST/ANKLE: Brand: DEROYAL

## (undated) DEVICE — HEADREST POS OD9IN THICKNESS 2IN RASPBERRY FOAM RNG CUSH

## (undated) DEVICE — Device: Brand: DISPOSABLE ELECTROSURGICAL SNARE

## (undated) DEVICE — BW-412T DISP COMBO CLEANING BRUSH: Brand: SINGLE USE COMBINATION CLEANING BRUSH

## (undated) DEVICE — MERCY FAIRFIELD TURNOVER KIT: Brand: MEDLINE INDUSTRIES, INC.

## (undated) DEVICE — KARLIN BLADE, ULTRA KNIFE, SATIN, STAINLESS STEEL, 1.5 MM TIP, 4 IN, SINGLE USE, (10/PK): Brand: SYMMETRY SURGICAL

## (undated) DEVICE — COVER,MAYO STAND,XL,STERILE: Brand: MEDLINE

## (undated) DEVICE — TOWEL,OR,DSP,ST,BLUE,STD,4/PK,20PK/CS: Brand: MEDLINE

## (undated) DEVICE — NEURO HBO 85949

## (undated) DEVICE — BLANKET WRM W40.2XL55.9IN IORT LO BODY + MISTRAL AIR

## (undated) DEVICE — SOLUTION IV IRRIG WATER 500ML POUR BRL ST 2F7113

## (undated) DEVICE — APPLICATOR PREP 26ML 0.7% IOD POVACRYLEX 74% ISO ALC ST

## (undated) DEVICE — GAUZE,SPONGE,4"X4",16PLY,XRAY,STRL,LF: Brand: MEDLINE

## (undated) DEVICE — SOLUTION IRRIG 1000ML 0.9% SOD CHL USP POUR PLAS BTL

## (undated) DEVICE — STERILE LATEX POWDER-FREE SURGICAL GLOVESWITH NITRILE AND EMOLLIENT COATINGS: Brand: PROTEXIS

## (undated) DEVICE — GOWN AURORA NONREINF LG: Brand: MEDLINE INDUSTRIES, INC.